# Patient Record
Sex: MALE | Race: WHITE | NOT HISPANIC OR LATINO | Employment: OTHER | URBAN - METROPOLITAN AREA
[De-identification: names, ages, dates, MRNs, and addresses within clinical notes are randomized per-mention and may not be internally consistent; named-entity substitution may affect disease eponyms.]

---

## 2017-02-11 RX ORDER — FUROSEMIDE 40 MG/1
TABLET ORAL
Qty: 90 TABLET | Refills: 3 | Status: SHIPPED | OUTPATIENT
Start: 2017-02-11

## 2017-03-29 ENCOUNTER — HOSPITAL ENCOUNTER (INPATIENT)
Facility: HOSPITAL | Age: 82
LOS: 2 days | Discharge: HOME | DRG: 189 | End: 2017-03-31
Attending: EMERGENCY MEDICINE | Admitting: INTERNAL MEDICINE
Payer: MEDICARE

## 2017-03-29 DIAGNOSIS — I27.20 PULMONARY HTN: ICD-10-CM

## 2017-03-29 DIAGNOSIS — J44.89 ASTHMA WITH COPD: Chronic | ICD-10-CM

## 2017-03-29 DIAGNOSIS — I48.0 AF (PAROXYSMAL ATRIAL FIBRILLATION): Chronic | ICD-10-CM

## 2017-03-29 DIAGNOSIS — Z99.81 ON HOME OXYGEN THERAPY: Chronic | ICD-10-CM

## 2017-03-29 DIAGNOSIS — E66.9 OBESITY (BMI 30.0-34.9): ICD-10-CM

## 2017-03-29 DIAGNOSIS — R91.1 LUNG NODULE: Chronic | ICD-10-CM

## 2017-03-29 DIAGNOSIS — I65.29 CAROTID ATHEROSCLEROSIS, UNSPECIFIED LATERALITY: Chronic | ICD-10-CM

## 2017-03-29 DIAGNOSIS — Z86.79 HISTORY OF AORTIC STENOSIS: Chronic | ICD-10-CM

## 2017-03-29 DIAGNOSIS — I70.0 ATHEROSCLEROSIS OF AORTA: Chronic | ICD-10-CM

## 2017-03-29 DIAGNOSIS — D72.829 LEUKOCYTOSIS, UNSPECIFIED TYPE: ICD-10-CM

## 2017-03-29 DIAGNOSIS — M24.511 CONTRACTURE OF JOINT OF RIGHT SHOULDER REGION: ICD-10-CM

## 2017-03-29 DIAGNOSIS — R06.03 ACUTE RESPIRATORY DISTRESS: ICD-10-CM

## 2017-03-29 DIAGNOSIS — I50.9 HEART FAILURE: ICD-10-CM

## 2017-03-29 DIAGNOSIS — I50.32 CHRONIC DIASTOLIC HEART FAILURE: Chronic | ICD-10-CM

## 2017-03-29 DIAGNOSIS — R94.31 ABNORMAL ECG: Chronic | ICD-10-CM

## 2017-03-29 DIAGNOSIS — N18.30 CKD (CHRONIC KIDNEY DISEASE) STAGE 3, GFR 30-59 ML/MIN: ICD-10-CM

## 2017-03-29 DIAGNOSIS — I35.0 NONRHEUMATIC AORTIC VALVE STENOSIS: Chronic | ICD-10-CM

## 2017-03-29 DIAGNOSIS — R73.03 PREDIABETES: ICD-10-CM

## 2017-03-29 DIAGNOSIS — I38 HEART VALVE REGURGITATION: ICD-10-CM

## 2017-03-29 DIAGNOSIS — R17 ELEVATED BILIRUBIN: ICD-10-CM

## 2017-03-29 DIAGNOSIS — I10 ESSENTIAL HYPERTENSION: Chronic | ICD-10-CM

## 2017-03-29 DIAGNOSIS — I71.40 ABDOMINAL AORTIC ANEURYSM WITHOUT RUPTURE: Chronic | ICD-10-CM

## 2017-03-29 DIAGNOSIS — R09.02 HYPOXIA: ICD-10-CM

## 2017-03-29 DIAGNOSIS — J18.9 PNEUMONIA OF BOTH LOWER LOBES DUE TO INFECTIOUS ORGANISM: ICD-10-CM

## 2017-03-29 DIAGNOSIS — I25.10 CORONARY ARTERY DISEASE, OCCLUSIVE: Chronic | ICD-10-CM

## 2017-03-29 DIAGNOSIS — I50.33 ACUTE ON CHRONIC DIASTOLIC CONGESTIVE HEART FAILURE: Primary | ICD-10-CM

## 2017-03-29 DIAGNOSIS — R06.02 SOB (SHORTNESS OF BREATH): ICD-10-CM

## 2017-03-29 PROBLEM — J96.20 ACUTE ON CHRONIC RESPIRATORY FAILURE: Status: ACTIVE | Noted: 2017-03-29

## 2017-03-29 LAB
ALBUMIN SERPL BCP-MCNC: 3.3 G/DL
ALLENS TEST: ABNORMAL
ALP SERPL-CCNC: 77 U/L
ALT SERPL W/O P-5'-P-CCNC: 14 U/L
ANION GAP SERPL CALC-SCNC: 11 MMOL/L
APTT BLDCRRT: 33.3 SEC
AST SERPL-CCNC: 14 U/L
BASOPHILS # BLD AUTO: 0.02 K/UL
BASOPHILS NFR BLD: 0.1 %
BILIRUB SERPL-MCNC: 1.3 MG/DL
BILIRUB UR QL STRIP: NEGATIVE
BNP SERPL-MCNC: 428 PG/ML
BUN SERPL-MCNC: 25 MG/DL
CALCIUM SERPL-MCNC: 9.4 MG/DL
CHLORIDE SERPL-SCNC: 100 MMOL/L
CK MB SERPL-MCNC: 1.6 NG/ML
CK MB SERPL-RTO: 0.8 %
CK SERPL-CCNC: 212 U/L
CK SERPL-CCNC: 212 U/L
CLARITY UR: CLEAR
CO2 SERPL-SCNC: 29 MMOL/L
COLOR UR: YELLOW
CREAT SERPL-MCNC: 1.4 MG/DL
D DIMER PPP IA.FEU-MCNC: 0.89 MG/L FEU
DELSYS: ABNORMAL
DIFFERENTIAL METHOD: ABNORMAL
EOSINOPHIL # BLD AUTO: 0 K/UL
EOSINOPHIL NFR BLD: 0.1 %
ERYTHROCYTE [DISTWIDTH] IN BLOOD BY AUTOMATED COUNT: 16.6 %
EST. GFR  (AFRICAN AMERICAN): 51 ML/MIN/1.73 M^2
EST. GFR  (NON AFRICAN AMERICAN): 44 ML/MIN/1.73 M^2
FIO2: 21
FLUAV AG SPEC QL IA: NEGATIVE
FLUBV AG SPEC QL IA: NEGATIVE
GLUCOSE SERPL-MCNC: 136 MG/DL
GLUCOSE UR QL STRIP: NEGATIVE
HCO3 UR-SCNC: 26.7 MMOL/L (ref 24–28)
HCT VFR BLD AUTO: 40.5 %
HGB BLD-MCNC: 13.1 G/DL
HGB UR QL STRIP: NEGATIVE
INR PPP: 1.3
KETONES UR QL STRIP: NEGATIVE
LACTATE SERPL-SCNC: 1.2 MMOL/L
LEUKOCYTE ESTERASE UR QL STRIP: NEGATIVE
LYMPHOCYTES # BLD AUTO: 0.7 K/UL
LYMPHOCYTES NFR BLD: 4.1 %
MAGNESIUM SERPL-MCNC: 1.8 MG/DL
MCH RBC QN AUTO: 27.1 PG
MCHC RBC AUTO-ENTMCNC: 32.3 %
MCV RBC AUTO: 84 FL
MODE: ABNORMAL
MONOCYTES # BLD AUTO: 2.1 K/UL
MONOCYTES NFR BLD: 12.2 %
NEUTROPHILS # BLD AUTO: 14.3 K/UL
NEUTROPHILS NFR BLD: 84 %
NITRITE UR QL STRIP: NEGATIVE
OVALOCYTES BLD QL SMEAR: ABNORMAL
PCO2 BLDA: 39.8 MMHG (ref 35–45)
PH SMN: 7.43 [PH] (ref 7.35–7.45)
PH UR STRIP: 6 [PH] (ref 5–8)
PHOSPHATE SERPL-MCNC: 2.3 MG/DL
PLATELET # BLD AUTO: 241 K/UL
PMV BLD AUTO: 9.7 FL
PO2 BLDA: 40 MMHG (ref 80–100)
POC BE: 2 MMOL/L
POC SATURATED O2: 77 % (ref 95–100)
POTASSIUM SERPL-SCNC: 4.7 MMOL/L
PROT SERPL-MCNC: 7.4 G/DL
PROT UR QL STRIP: ABNORMAL
PROTHROMBIN TIME: 13.7 SEC
RBC # BLD AUTO: 4.84 M/UL
SAMPLE: ABNORMAL
SITE: ABNORMAL
SODIUM SERPL-SCNC: 140 MMOL/L
SP GR UR STRIP: 1.02 (ref 1–1.03)
SPECIMEN SOURCE: NORMAL
STOMATOCYTES BLD QL SMEAR: PRESENT
TROPONIN I SERPL DL<=0.01 NG/ML-MCNC: 0.02 NG/ML
TSH SERPL DL<=0.005 MIU/L-ACNC: 0.62 UIU/ML
URN SPEC COLLECT METH UR: ABNORMAL
UROBILINOGEN UR STRIP-ACNC: NEGATIVE EU/DL
WBC # BLD AUTO: 17.17 K/UL

## 2017-03-29 PROCEDURE — 25000003 PHARM REV CODE 250: Performed by: EMERGENCY MEDICINE

## 2017-03-29 PROCEDURE — 94799 UNLISTED PULMONARY SVC/PX: CPT

## 2017-03-29 PROCEDURE — 96365 THER/PROPH/DIAG IV INF INIT: CPT

## 2017-03-29 PROCEDURE — 85379 FIBRIN DEGRADATION QUANT: CPT

## 2017-03-29 PROCEDURE — 94761 N-INVAS EAR/PLS OXIMETRY MLT: CPT

## 2017-03-29 PROCEDURE — 94640 AIRWAY INHALATION TREATMENT: CPT

## 2017-03-29 PROCEDURE — 84443 ASSAY THYROID STIM HORMONE: CPT

## 2017-03-29 PROCEDURE — 80053 COMPREHEN METABOLIC PANEL: CPT

## 2017-03-29 PROCEDURE — 83735 ASSAY OF MAGNESIUM: CPT

## 2017-03-29 PROCEDURE — 83605 ASSAY OF LACTIC ACID: CPT

## 2017-03-29 PROCEDURE — 63600175 PHARM REV CODE 636 W HCPCS: Performed by: EMERGENCY MEDICINE

## 2017-03-29 PROCEDURE — 25000242 PHARM REV CODE 250 ALT 637 W/ HCPCS: Performed by: NURSE PRACTITIONER

## 2017-03-29 PROCEDURE — 82553 CREATINE MB FRACTION: CPT

## 2017-03-29 PROCEDURE — 83880 ASSAY OF NATRIURETIC PEPTIDE: CPT

## 2017-03-29 PROCEDURE — 36600 WITHDRAWAL OF ARTERIAL BLOOD: CPT

## 2017-03-29 PROCEDURE — 63600175 PHARM REV CODE 636 W HCPCS: Performed by: NURSE PRACTITIONER

## 2017-03-29 PROCEDURE — 85610 PROTHROMBIN TIME: CPT

## 2017-03-29 PROCEDURE — 99900035 HC TECH TIME PER 15 MIN (STAT)

## 2017-03-29 PROCEDURE — 96375 TX/PRO/DX INJ NEW DRUG ADDON: CPT

## 2017-03-29 PROCEDURE — 85730 THROMBOPLASTIN TIME PARTIAL: CPT

## 2017-03-29 PROCEDURE — 84100 ASSAY OF PHOSPHORUS: CPT

## 2017-03-29 PROCEDURE — 82803 BLOOD GASES ANY COMBINATION: CPT

## 2017-03-29 PROCEDURE — 25500020 PHARM REV CODE 255: Performed by: EMERGENCY MEDICINE

## 2017-03-29 PROCEDURE — 87040 BLOOD CULTURE FOR BACTERIA: CPT | Mod: 59

## 2017-03-29 PROCEDURE — 81003 URINALYSIS AUTO W/O SCOPE: CPT

## 2017-03-29 PROCEDURE — 25000003 PHARM REV CODE 250: Performed by: NURSE PRACTITIONER

## 2017-03-29 PROCEDURE — 84484 ASSAY OF TROPONIN QUANT: CPT

## 2017-03-29 PROCEDURE — 21400001 HC TELEMETRY ROOM

## 2017-03-29 PROCEDURE — 93010 ELECTROCARDIOGRAM REPORT: CPT | Mod: ,,, | Performed by: INTERNAL MEDICINE

## 2017-03-29 PROCEDURE — 87400 INFLUENZA A/B EACH AG IA: CPT | Mod: 59

## 2017-03-29 PROCEDURE — 27000221 HC OXYGEN, UP TO 24 HOURS

## 2017-03-29 PROCEDURE — 99285 EMERGENCY DEPT VISIT HI MDM: CPT | Mod: 25

## 2017-03-29 PROCEDURE — 85025 COMPLETE CBC W/AUTO DIFF WBC: CPT

## 2017-03-29 RX ORDER — FUROSEMIDE 10 MG/ML
60 INJECTION INTRAMUSCULAR; INTRAVENOUS 2 TIMES DAILY
Status: DISCONTINUED | OUTPATIENT
Start: 2017-03-29 | End: 2017-03-30

## 2017-03-29 RX ORDER — PANTOPRAZOLE SODIUM 40 MG/1
40 TABLET, DELAYED RELEASE ORAL DAILY
Status: DISCONTINUED | OUTPATIENT
Start: 2017-03-29 | End: 2017-03-31 | Stop reason: HOSPADM

## 2017-03-29 RX ORDER — TAMSULOSIN HYDROCHLORIDE 0.4 MG/1
1 CAPSULE ORAL DAILY
Status: DISCONTINUED | OUTPATIENT
Start: 2017-03-29 | End: 2017-03-30

## 2017-03-29 RX ORDER — ENOXAPARIN SODIUM 100 MG/ML
1 INJECTION SUBCUTANEOUS
Status: COMPLETED | OUTPATIENT
Start: 2017-03-29 | End: 2017-03-29

## 2017-03-29 RX ORDER — FUROSEMIDE 10 MG/ML
60 INJECTION INTRAMUSCULAR; INTRAVENOUS
Status: COMPLETED | OUTPATIENT
Start: 2017-03-29 | End: 2017-03-29

## 2017-03-29 RX ORDER — ATORVASTATIN CALCIUM 40 MG/1
80 TABLET, FILM COATED ORAL DAILY
Status: DISCONTINUED | OUTPATIENT
Start: 2017-03-29 | End: 2017-03-31 | Stop reason: HOSPADM

## 2017-03-29 RX ORDER — IPRATROPIUM BROMIDE AND ALBUTEROL SULFATE 2.5; .5 MG/3ML; MG/3ML
3 SOLUTION RESPIRATORY (INHALATION)
Status: DISCONTINUED | OUTPATIENT
Start: 2017-03-29 | End: 2017-03-31 | Stop reason: HOSPADM

## 2017-03-29 RX ORDER — RANOLAZINE 500 MG/1
500 TABLET, EXTENDED RELEASE ORAL 2 TIMES DAILY
Status: DISCONTINUED | OUTPATIENT
Start: 2017-03-29 | End: 2017-03-31 | Stop reason: HOSPADM

## 2017-03-29 RX ORDER — ATENOLOL 50 MG/1
50 TABLET ORAL DAILY
Status: DISCONTINUED | OUTPATIENT
Start: 2017-03-29 | End: 2017-03-30

## 2017-03-29 RX ORDER — CLOPIDOGREL BISULFATE 75 MG/1
75 TABLET ORAL DAILY
Status: DISCONTINUED | OUTPATIENT
Start: 2017-03-29 | End: 2017-03-31 | Stop reason: HOSPADM

## 2017-03-29 RX ORDER — ISOSORBIDE MONONITRATE 60 MG/1
120 TABLET, EXTENDED RELEASE ORAL DAILY
Status: DISCONTINUED | OUTPATIENT
Start: 2017-03-29 | End: 2017-03-31 | Stop reason: HOSPADM

## 2017-03-29 RX ORDER — ACETAMINOPHEN 325 MG/1
650 TABLET ORAL
Status: COMPLETED | OUTPATIENT
Start: 2017-03-29 | End: 2017-03-29

## 2017-03-29 RX ORDER — DILTIAZEM HYDROCHLORIDE 120 MG/1
120 CAPSULE, COATED, EXTENDED RELEASE ORAL DAILY
Status: DISCONTINUED | OUTPATIENT
Start: 2017-03-29 | End: 2017-03-30

## 2017-03-29 RX ORDER — ONDANSETRON 2 MG/ML
4 INJECTION INTRAMUSCULAR; INTRAVENOUS EVERY 8 HOURS PRN
Status: DISCONTINUED | OUTPATIENT
Start: 2017-03-29 | End: 2017-03-31 | Stop reason: HOSPADM

## 2017-03-29 RX ORDER — ASPIRIN 81 MG/1
81 TABLET ORAL NIGHTLY
Status: DISCONTINUED | OUTPATIENT
Start: 2017-03-29 | End: 2017-03-31 | Stop reason: HOSPADM

## 2017-03-29 RX ADMIN — ENOXAPARIN SODIUM 90 MG: 100 INJECTION SUBCUTANEOUS at 11:03

## 2017-03-29 RX ADMIN — IOHEXOL 100 ML: 350 INJECTION, SOLUTION INTRAVENOUS at 11:03

## 2017-03-29 RX ADMIN — IPRATROPIUM BROMIDE AND ALBUTEROL SULFATE 3 ML: .5; 3 SOLUTION RESPIRATORY (INHALATION) at 08:03

## 2017-03-29 RX ADMIN — CEFTRIAXONE 1 G: 1 INJECTION, SOLUTION INTRAVENOUS at 12:03

## 2017-03-29 RX ADMIN — FUROSEMIDE 60 MG: 10 INJECTION, SOLUTION INTRAMUSCULAR; INTRAVENOUS at 11:03

## 2017-03-29 RX ADMIN — FUROSEMIDE 60 MG: 10 INJECTION, SOLUTION INTRAMUSCULAR; INTRAVENOUS at 05:03

## 2017-03-29 RX ADMIN — AZITHROMYCIN MONOHYDRATE 500 MG: 500 INJECTION, POWDER, LYOPHILIZED, FOR SOLUTION INTRAVENOUS at 03:03

## 2017-03-29 RX ADMIN — ACETAMINOPHEN 650 MG: 325 TABLET ORAL at 09:03

## 2017-03-29 NOTE — SUBJECTIVE & OBJECTIVE
Past Medical History:   Diagnosis Date    *Atrial fibrillation     Acute decompensated heart failure 4/8/2014    Anticoagulant long-term use     aortic stenosis     degenerative- trileaflets    Aortic stenosis, severe 4/9/2014    Aortic valve disorders 5/13/2014    Arthritis     Asthma     Atrial fibrillation with RVR 1/30/2014    Atrial fibrillation, permanent 8/7/2013    Cancer 1970s, 2006, 2009    skin-removed from nose    Cardiomyopathy     Carotid atherosclerosis 6/27/2016    CHF (congestive heart failure)     CKD (chronic kidney disease) stage 3, GFR 30-59 ml/min     Coronary artery disease     Coronary artery disease involving native coronary artery of native heart without angina pectoris 10/29/2014    Per patient s/p stents x2     DDD (degenerative disc disease), cervical     Heart murmur     History of coronary angioplasty 10/3/2014    Hypertension     Hypotension, postural 8/7/2013    Pleural effusion     x3 total Thoracentesis 3/11/14 and 2/14/14     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    Severe aortic stenosis 4/9/2014    Syncope and collapse 2/3/2014    Tobacco dependence     resolved       Past Surgical History:   Procedure Laterality Date    CARDIAC CATHETERIZATION  4/11/14    s/p ostial LM HORACIO, mid-RCA HORACIO    CARDIAC VALVE SURGERY  05/2014    aortic     CATARACT EXTRACTION Bilateral     EYE SURGERY Bilateral     laser    ruptured tendon      right arm    SKIN BIOPSY      SKIN CANCER EXCISION      per patient nose x3    THORACENTESIS      x3       Review of patient's allergies indicates:  No Known Allergies    No current facility-administered medications on file prior to encounter.      Current Outpatient Prescriptions on File Prior to Encounter   Medication Sig    aspirin (ECOTRIN) 81 MG EC tablet Take 81 mg by mouth every evening.    atenolol (TENORMIN) 25 MG tablet TAKE 2 TABLETS ONE TIME DAILY    atorvastatin (LIPITOR) 80 MG tablet TAKE  1 TABLET ONE TIME DAILY    CARTIA  mg Cp24 TAKE 1 CAPSULE EVERY DAY    clopidogrel (PLAVIX) 75 mg tablet TAKE 1 TABLET ONE TIME DAILY    furosemide (LASIX) 40 MG tablet TAKE 1 TABLET EVERY DAY    isosorbide mononitrate (IMDUR) 120 MG 24 hr tablet TAKE 1 TABLET EVERY DAY    MULTIVITAMIN W-MINERALS/LUTEIN (CENTRUM SILVER ORAL) Take by mouth.    pantoprazole (PROTONIX) 40 MG tablet TAKE 1 TABLET ONE TIME DAILY    potassium chloride SA (K-DUR,KLOR-CON) 20 MEQ tablet TAKE 1 TABLET ONE TIME DAILY    ranolazine (RANEXA) 500 MG Tb12 Take 1 tablet (500 mg total) by mouth 2 (two) times daily.    tamsulosin (FLOMAX) 0.4 mg Cp24 Take 1 capsule (0.4 mg total) by mouth once daily.    albuterol-ipratropium 2.5mg-0.5mg/3mL (DUO-NEB) 0.5 mg-3 mg(2.5 mg base)/3 mL nebulizer solution Take 3 mLs by nebulization every 6 (six) hours. (Patient taking differently: Take 3 mLs by nebulization 2 (two) times daily. )    co-enzyme Q-10 50 mg capsule Take 50 mg by mouth once daily.    GLUCOSAMINE HCL/CHONDR HOLT A NA (OSTEO BI-FLEX ORAL) Take by mouth 2 (two) times daily.    magnesium oxide (MAG-OX) 400 mg tablet Take 400 mg by mouth 2 (two) times daily.      Family History     Problem Relation (Age of Onset)    Emphysema Sister    Heart attack Brother, Brother, Sister    Heart disease Father, Brother, Brother, Sister    Hypertension Brother    No Known Problems Brother    Pulmonary embolism Mother    Stroke Brother        Social History Main Topics    Smoking status: Former Smoker     Packs/day: 1.00     Years: 25.00     Quit date: 8/7/1968    Smokeless tobacco: Never Used    Alcohol use No    Drug use: No    Sexual activity: No     Review of Systems   Constitutional: Positive for fever.   HENT: Positive for congestion. Negative for sinus pressure, sneezing, sore throat and trouble swallowing.    Eyes: Negative for redness, itching and visual disturbance.   Respiratory: Positive for cough, shortness of breath and wheezing.     Cardiovascular: Positive for leg swelling. Negative for chest pain and palpitations.   Gastrointestinal: Negative for abdominal pain, diarrhea, nausea and vomiting.   Endocrine: Negative for cold intolerance and heat intolerance.   Genitourinary: Negative for difficulty urinating, dysuria, flank pain, frequency and urgency.   Musculoskeletal: Negative for arthralgias, back pain and myalgias.   Skin: Negative for color change and wound.   Allergic/Immunologic: Negative for environmental allergies and food allergies.   Neurological: Positive for weakness. Negative for dizziness, syncope and headaches.   Hematological: Does not bruise/bleed easily.   Psychiatric/Behavioral: Negative for agitation, confusion and sleep disturbance. The patient is not nervous/anxious.      Objective:     Vital Signs (Most Recent):  Temp: 98.7 °F (37.1 °C) (03/29/17 1624)  Pulse: 87 (03/29/17 1624)  Resp: (!) 22 (03/29/17 1624)  BP: (!) 114/55 (03/29/17 1624)  SpO2: (!) 92 % (03/29/17 1624) Vital Signs (24h Range):  Temp:  [98.7 °F (37.1 °C)-99.6 °F (37.6 °C)] 98.7 °F (37.1 °C)  Pulse:  [87-95] 87  Resp:  [18-22] 22  SpO2:  [83 %-93 %] 92 %  BP: (109-122)/(52-65) 114/55     Weight: 87.3 kg (192 lb 6.4 oz)  Body mass index is 31.05 kg/(m^2).    Physical Exam   Constitutional: He is oriented to person, place, and time. He appears well-developed and well-nourished.   HENT:   Head: Normocephalic.   Nose: Nose normal.   Mouth/Throat: Oropharynx is clear and moist.   Eyes: Conjunctivae and EOM are normal.   Neck: Normal range of motion.   Cardiovascular: Normal rate, regular rhythm, normal heart sounds and intact distal pulses.  Exam reveals no friction rub.    Pulmonary/Chest: Tachypnea noted. He has wheezes in the right upper field and the left upper field. He has rales in the right lower field and the left lower field.   Abdominal: Soft. Bowel sounds are normal. He exhibits no distension. There is no tenderness.   Musculoskeletal: Normal  range of motion.   Neurological: He is alert and oriented to person, place, and time.   Skin: Skin is warm and dry.   Psychiatric: He has a normal mood and affect. His behavior is normal. Thought content normal.        Significant Labs:   CBC:   Recent Labs  Lab 03/29/17  0915   WBC 17.17*   HGB 13.1*   HCT 40.5        CMP:   Recent Labs  Lab 03/29/17  0915      K 4.7      CO2 29   *   BUN 25*   CREATININE 1.4   CALCIUM 9.4   PROT 7.4   ALBUMIN 3.3*   BILITOT 1.3*   ALKPHOS 77   AST 14   ALT 14   ANIONGAP 11   EGFRNONAA 44*       Significant Imaging:   Imaging Results         CTA Chest Non Coronary (Final result) Result time:  03/29/17 12:25:53    Final result by Cam Palacios MD (03/29/17 12:25:53)    Impression:        1. No pulmonary embolus.   2.  Bilateral lower lobe pneumonia, much worse in the right lung, with some reactive right hilar lymphadenopathy.        All CT scans at this facility use dose modulation, iterative reconstruction and/or weight based dosing when appropriate to reduce radiation dose to as low as reasonably achievable.      Electronically signed by: CAM PALACIOS MD  Date:     03/29/17  Time:    12:25     Narrative:    Exam: CTA chest with intravenous contrast.    Clinical History: Altered shortness of breath.    Technique: Axial CTA images performed through the chest after the administration of 100 cc intravenous contrast. 3D MIP images were performed and interpreted.    Findings:        There is no evidence of pulmonary embolus. Pulmonary artery caliber is normal. The patient has had an aortic valve repair.  There is calcific atherosclerotic disease of the coronary arteries.  The heart size is enlarged.  There is no thoracic aortic aneurysm or dissection. There is some mild reactive right hilar lymphadenopathy.    There is patchy consolidation throughout the entirety of the right lower lobe.  There is bronchial wall thickening and some bronchovascular interstitial  infiltrate in the left lower lobe.  The upper lobes and right middle lobe are clear. No pleural effusion or pneumothorax.    There are calcified gallstones.    There are old left-sided rib fractures.            X-Ray Chest 1 View (Final result) Result time:  03/29/17 10:09:13    Final result by SPEEDY Harris Sr., MD (03/29/17 10:09:13)    Impression:        1.  There is no evidence of an acute pulmonary process.    2.  There is silhouetting of the inferior aspect of the left border of the heart.  This is characteristic of a pericardial fat pad.  3.  There are healed fractures on the left side of the thoracic cage.        Electronically signed by: SPEEDY HARRIS MD  Date:     03/29/17  Time:    10:09     Narrative:    One-view chest x-ray    Clinical History: Shortness of breath    Finding: Comparison was made to a prior examination performed on 4/19/2016.  There is silhouetting of the inferior aspect of the left border of the heart.  There is no evidence of an acute pulmonary process.  There is no pneumothorax.  The costophrenic angles are sharp.  There are healed fractures on the left side of the thoracic cage.

## 2017-03-29 NOTE — ASSESSMENT & PLAN NOTE
-Pt admitted to Telemetry Unit   -IVF  -IV antibiotics (Azithromycin and Rocephin)  -supplemental oxygen  -lactate 1.2  -will follow blood culture results

## 2017-03-29 NOTE — ED NOTES
Pt. Lying in bed quietly, resp. Even and unlabored, o2 3L NC in progress. Pt. Reports he feels better. Son at bedside.

## 2017-03-29 NOTE — PLAN OF CARE
Problem: Patient Care Overview  Goal: Individualization & Mutuality  Outcome: Ongoing (interventions implemented as appropriate)  DX:CHF  PATIENT REMAINS FREE FROM FALLS, FALL PRECAUTIONS IN PLACE.  VS STABLE  NO OTHER C/O AT THIS TIME  CALL [facility] WITHIN REACH, REMINDED TO CALL FOR ASSISTANCE.

## 2017-03-29 NOTE — IP AVS SNAPSHOT
[address] [place] [address]           Patient Discharge Instructions   Our goal is to set you up for success. This packet includes information on your condition, medications, and your home care.  It will help you care for yourself to prevent having to return to the hospital.     Please ask your nurse if you have any questions.      There are many details to remember when preparing to leave the hospital. Here is what you will need to do:    1. Take your medicine. If you are prescribed medications, review your Medication List on the following pages. You may have new medications to  at the pharmacy and others that you'll need to stop taking. Review the instructions for how and when to take your medications. Talk with your doctor or nurses if you are unsure of what to do.     2. Go to your follow-up appointments. Specific follow-up information is listed in the following pages. Your may be contacted by a nurse or clinical provider about future appointments. Be sure we have all of the phone numbers to reach you. Please contact your provider's office if you are unable to make an appointment.     3. Watch for warning signs. Your doctor or nurse will give you detailed warning signs to watch for and when to call for assistance. These instructions may also include educational information about your condition. If you experience any of warning signs to your health, call your doctor.               ** Verify the list of medication(s) below is accurate and up to date. Carry this with you in case of emergency. If your medications have changed, please notify your healthcare provider.             Medication List      START taking these medications        Additional Info                      amoxicillin-clavulanate 875-125mg 875-125 mg per tablet   Commonly known as:  AUGMENTIN   Quantity:  20 tablet   Refills:  0   Dose:  1 tablet    Instructions:  Take 1 tablet by mouth 2  (two) times daily.     Begin Date    AM    Noon    PM    Bedtime       azithromycin 250 MG tablet   Commonly known as:  Z-DEEPAK   Quantity:  5 tablet   Refills:  0   Dose:  250 mg    Instructions:  Take 1 tablet (250 mg total) by mouth once daily.     Begin Date    AM    Noon    PM    Bedtime       Saccharomyces boulardii 250 mg capsule   Commonly known as:  FLORASTOR   Quantity:  60 capsule   Refills:  0   Dose:  250 mg    Instructions:  Take 1 capsule (250 mg total) by mouth 2 (two) times daily.     Begin Date    AM    Noon    PM    Bedtime         CHANGE how you take these medications        Additional Info                      albuterol-ipratropium 2.5mg-0.5mg/3mL 0.5 mg-3 mg(2.5 mg base)/3 mL nebulizer solution   Commonly known as:  DUO-NEB   Quantity:  120 vial   Refills:  12   Dose:  3 mL   What changed:  when to take this    Last time this was given:  3 mLs on 3/31/2017  7:30 AM   Instructions:  Take 3 mLs by nebulization every 6 (six) hours.     Begin Date    AM    Noon    PM    Bedtime         CONTINUE taking these medications        Additional Info                      aspirin 81 MG EC tablet   Commonly known as:  ECOTRIN   Refills:  0   Dose:  81 mg    Last time this was given:  81 mg on 3/30/2017 10:16 PM   Instructions:  Take 81 mg by mouth every evening.     Begin Date    AM    Noon    PM    Bedtime       atenolol 25 MG tablet   Commonly known as:  TENORMIN   Quantity:  180 tablet   Refills:  3    Last time this was given:  50 mg on 3/30/2017 10:16 PM   Instructions:  TAKE 2 TABLETS ONE TIME DAILY     Begin Date    AM    Noon    PM    Bedtime       atorvastatin 80 MG tablet   Commonly known as:  LIPITOR   Quantity:  90 tablet   Refills:  4    Instructions:  TAKE 1 TABLET ONE TIME DAILY     Begin Date    AM    Noon    PM    Bedtime       CARTIA  MG Cp24   Quantity:  90 capsule   Refills:  3   Generic drug:  diltiaZEM    Last time this was given:  120 mg on 3/30/2017 10:16 PM   Instructions:  TAKE 1  CAPSULE EVERY DAY     Begin Date    AM    Noon    PM    Bedtime       CENTRUM SILVER ORAL   Refills:  0    Instructions:  Take by mouth.     Begin Date    AM    Noon    PM    Bedtime       clopidogrel 75 mg tablet   Commonly known as:  PLAVIX   Quantity:  90 tablet   Refills:  3    Instructions:  TAKE 1 TABLET ONE TIME DAILY     Begin Date    AM    Noon    PM    Bedtime       co-enzyme Q-10 50 mg capsule   Refills:  0   Dose:  50 mg    Instructions:  Take 50 mg by mouth once daily.     Begin Date    AM    Noon    PM    Bedtime       furosemide 40 MG tablet   Commonly known as:  LASIX   Quantity:  90 tablet   Refills:  3    Instructions:  TAKE 1 TABLET EVERY DAY     Begin Date    AM    Noon    PM    Bedtime       isosorbide mononitrate 120 MG 24 hr tablet   Commonly known as:  IMDUR   Quantity:  90 tablet   Refills:  3    Instructions:  TAKE 1 TABLET EVERY DAY     Begin Date    AM    Noon    PM    Bedtime       magnesium oxide 400 mg tablet   Commonly known as:  MAG-OX   Refills:  0   Dose:  400 mg    Instructions:  Take 400 mg by mouth 2 (two) times daily.     Begin Date    AM    Noon    PM    Bedtime       OSTEO BI-FLEX ORAL   Refills:  0    Instructions:  Take by mouth 2 (two) times daily.     Begin Date    AM    Noon    PM    Bedtime       pantoprazole 40 MG tablet   Commonly known as:  PROTONIX   Quantity:  90 tablet   Refills:  4    Instructions:  TAKE 1 TABLET ONE TIME DAILY     Begin Date    AM    Noon    PM    Bedtime       potassium chloride SA 20 MEQ tablet   Commonly known as:  K-DUR,KLOR-CON   Quantity:  90 tablet   Refills:  3    Instructions:  TAKE 1 TABLET ONE TIME DAILY     Begin Date    AM    Noon    PM    Bedtime       ranolazine 500 MG Tb12   Commonly known as:  RANEXA   Quantity:  180 tablet   Refills:  3   Dose:  500 mg    Last time this was given:  500 mg on 3/30/2017 10:17 PM   Instructions:  Take 1 tablet (500 mg total) by mouth 2 (two) times daily.     Begin Date    AM    Noon    PM     Bedtime       tamsulosin 0.4 mg Cp24   Commonly known as:  FLOMAX   Quantity:  90 capsule   Refills:  3   Dose:  1 capsule    Last time this was given:  0.4 mg on 3/30/2017  7:50 PM   Instructions:  Take 1 capsule (0.4 mg total) by mouth once daily.     Begin Date    AM    Noon    PM    Bedtime            Where to Get Your Medications      These medications were sent to [facility] [address] [place] [address]     Phone:  [phone]     amoxicillin-clavulanate 875-125mg 875-125 mg per tablet    azithromycin 250 MG tablet    Saccharomyces boulardii 250 mg capsule                  Please bring to all follow up appointments:    1. A copy of your discharge instructions.  2. All medicines you are currently taking in their original bottles.  3. Identification and insurance card.    Please arrive 15 minutes ahead of scheduled appointment time.    Please call 24 hours in advance if you must reschedule your appointment and/or time.        Your Scheduled Appointments     Jul 17, 2017  8:00 AM CDT   Us Abdomen with [facility] US[facility]   [facility]    [address]  [facility] [address] [phone]              Follow-up Information     Follow up with Blaire Kent MD In 1 week.    Specialty:  Family Medicine    Why:  Hospital follow up    Contact information:    [address] [facility] [address]  [facility] [address]  [phone]        Referrals     Future Orders    Ambulatory Referral to Pharmacy Assistance     Questions:    Medication(s) needing assistance with?:  [facility] // pharmacy patient   assistanance referral placed        Discharge Instructions     Future Orders    Diet Cardiac     Scheduling Instructions:    Limit Sodium to 2 g daily        Primary Diagnosis     Your primary diagnosis was:  Bilateral Pneumonia      Admission Information     Date & Time Provider Department CSN    3/29/2017  8:50  "AM Galindo Mckinonn MD [facility] [id number]      Care Providers     Provider Role Specialty Primary office phone    Galindo Mckinnon MD Attending Provider Internal Medicine [phone]    Galindo Mckinnon MD Consulting Physician  Internal Medicine [phone]    Galindo Mckinnon MD Team Attending  Internal Medicine [phone]      Your Vitals Were     BP Pulse Temp Resp Height Weight    128/83 (BP Location: Right arm, Patient Position: Sitting, BP Method: Automatic) 114 98.4 °F (36.9 °C) (Oral) 20 5' 6" (1.676 m) 87.2 kg (192 lb 4.8 oz)    SpO2 BMI             93% 31.04 kg/m2         Recent Lab Values        11/18/2013 5/13/2014 6/2/2014 11/18/2014 7/8/2016              11:36 AM  6:25 AM  9:04 AM 10:26 AM 12:12 PM       A1C 6.4 (H) 6.0 6.1 6.2 5.9       Comment for A1C at 12:12 PM on 7/8/2016:  According to ADA guidelines, hemoglobin A1C <7.0% represents  optimal control in non-pregnant diabetic patients.  Different  metrics may apply to specific populations.   Standards of Medical Care in Diabetes - 2016.  For the purpose of screening for the presence of diabetes:  <5.7%     Consistent with the absence of diabetes  5.7-6.4%  Consistent with increasing risk for diabetes   (prediabetes)  >or=6.5%  Consistent with diabetes  Currently no consensus exists for use of hemoglobin A1C  for diagnosis of diabetes for children.        Pending Labs     Order Current Status    Procalcitonin In process    Blood culture Preliminary result    Blood culture Preliminary result      Allergies as of 3/31/2017     No Known Allergies      [facility]     [facility] Nurse Care Line - 24/7 Assistance  Unless otherwise directed by your provider, please contact [facility], our nurse care line that is available for 24/7 assistance.     Registered nurses in the [facility] provide clinical advisement, health education, appointment booking, and other advisory services.  Call for this " free service at [phone].        Advance Directives     An advance directive is a document which, in the event you are no longer able to make decisions for yourself, tells your healthcare team what kind of treatment you do or do not want to receive, or who you would like to make those decisions for you.  If you do not currently have an advance directive, [facility] encourages you to create one.  For more information call:  [phone]), [phone] ([phone]),  or log on to [email or web address].        Smoking Cessation     If you would like to quit smoking:   You may be eligible for free services if you are a [place] resident and started smoking cigarettes before September 1, 1988.  Call the Smoking Cessation Trust (SCT) toll free at (143) 250-9842 or (219) 695-9046.   Call 4-987-QUIT-NOW if you do not meet the above criteria.   Contact us via email: [email or web address]   View our website for more information: [email or web address]        Language Assistance Services     ATTENTION: Language assistance services are available, free of charge. Please call [phone].      ATENCIÓN: Si habla español, tiene a allen disposición servicios gratuitos de asistencia lingüística. Llame al [phone].     CHÚ Ý: N?u b?n nói Ti?ng Vi?t, có các d?ch v? h? tr? ngôn ng? mi?n phí dành cho b?n. G?i s? [phone].        Stroke Education              Heart Failure Education       Heart Failure: Being Active  You have a condition called heart failure. Being active doesnt mean that you have to wear yourself out. Even a little movement each day helps to strengthen your heart. If you cant get out to exercise, you can do simple stretching and strengthening exercises at home. These are good ways to keep you well-conditioned and prevent you and your heart from becoming excessively weak.    Ideas to get you started  · Add a little movement to things you do now. Walk to mail letters. Karla  your car at the far end of the parking lot and walk to the store. Walk up a flight of stairs instead of taking the elevator.  · Choose activities you enjoy. You might walk, swim, or ride an exercise bike. Things like gardening and washing the car count, too. Other possibilities include: washing dishes, walking the dog, walking around the mall, and doing aerobic activities with friends.  · Join a group exercise program at a [facility], a senior center, or a community center. Or look into a hospital cardiac rehabilitation program. Ask your doctor if you qualify.  Tips to keep you going  · Get up and get dressed each day. Go to a coffee shop and read a newspaper or go somewhere that you'll be in the presence of other active people. Youll feel more like being active.  · Make a plan. Choose one or more activities that you enjoy and that you can easily do. Then plan to do at least one each day. You might write your plan on a calendar.  · Go with a friend or a group if you like company. This can help you feel supported and stay motivated, too.  · Plan social events that you enjoy. This will keep you mentally engaged as well as physically motivated to do things you find pleasure in.  For your safety  · Talk with your healthcare provider before starting an exercise program.  · Exercise indoors when its too hot or too cold outside, or when the air quality is poor. Try walking at a shopping mall.  · Wear socks and sturdy shoes to maintain your balance and prevent falls.  · Start slowly. Do a few minutes several times a day at first. Increase your time and speed little by little.  · Stop and rest whenever you feel tired or get short of breath.  · Dont push yourself on days when you dont feel well.  Date Last Reviewed: 3/20/2016  © 1852-1084 [employer]. [address] [place] [address], [place] [address]. All rights reserved. This information is not intended as a substitute for professional medical care. Always follow  your healthcare professional's instructions.              Heart Failure: Evaluating Your Heart  You have a condition called heart failure. To evaluate your condition, your doctor will examine you, ask questions, and do some tests. Along with looking for signs of heart failure, the doctor looks for any other health problems that may have led to heart failure. The results of your evaluation will help your doctor form a treatment plan.  Health history and physical exam  Your visit will start with a health history. Tell the doctor about any symptoms youve noticed and about all medicines you take. Then youll have a physical exam. This includes listening to your heartbeat and breathing. Youll also be checked for swelling (edema) in your legs and neck. When you have fluid buildup or fluid in the lungs, it may be called congestive heart failure.  Diagnosing heart failure     During an echocardiogram, sound waves bounce off the heart. These are converted into a picture on the screen.   The following may be done to help your doctor form a diagnosis:  · X-rays show the size and shape of your heart. These pictures can also show fluid in your lungs.  · An electrocardiogram (ECG or EKG) shows the pattern of your heartbeat. Small pads (electrodes) are placed on your chest, arms, and legs. Wires connect the pads to the ECG machine, which records your hearts electrical signals. This can give the doctor information about heart function.  · An echocardiogram uses ultrasound waves to show the structure and movement of your heart muscle. This shows how well the heart pumps. It also shows the thickness of the heart walls, and if the heart is enlarged. It is one of the most useful, non-invasive tests as it provides information about the heart's general function. This helps your doctor make treatment decisions.  · Lab tests evaluate small amounts of blood or urine for signs of problems. A BNP lab test can help diagnose and evaluate  heart failure. BNP stands for B-type natriuretic peptide. The ventricles secrete more BNP when heart failure worsens. Lab tests can also provide information about metabolic dysfunction or heart dysfunction.  Your treatment plan  Based on the results of your evaluation and tests, your doctor will develop a treatment plan. This plan is designed to relieve some of your heart failure symptoms and help make you more comfortable. Your treatment plan may include:  · Medicine to help your heart work better and improve your quality of life  · Changes in what you eat and drink to help prevent fluid from backing up in your body  · Daily monitoring of your weight and heart failure symptoms to see how well your treatment plan is working  · Exercise to help you stay healthy  · Help with quitting smoking  · Emotional and psychological support to help adjust to the changes  · Referrals to other specialists to make sure you are being treated comprehensively  Date Last Reviewed: 3/21/2016  © 5647-4224 [employer]. [address] [place] [address]. All rights reserved. This information is not intended as a substitute for professional medical care. Always follow your healthcare professional's instructions.              Heart Failure: Making Changes to Your Diet  You have a condition called heart failure. When you have heart failure, excess fluid is more likely to build up in your body because your heart isn't working well. This makes the heart work harder to pump blood. Fluid buildup causes symptoms such as shortness of breath and swelling (edema). This is often referred to as congestive heart failure or CHF. Controlling the amount of salt (sodium) you eat may help stop fluid from building up. Your doctor may also tell you to reduce the amount of fluid you drink.  Reading food labels    Your healthcare provider will tell you how much sodium you can eat each day. Read food labels to keep track. Keep in mind that  certain foods are high in salt. These include canned, frozen, and processed foods. Check the amount of sodium in each serving. Watch out for high-sodium ingredients. These include MSG (monosodium glutamate), baking soda, and sodium phosphate.   Eating less salt  Give yourself time to get used to eating less salt. It may take a little while. Here are some tips to help:  · Take the saltshaker off the table. Replace it with salt-free herb mixes and spices.  · Eat fresh or plain frozen vegetables. These have much less salt than canned vegetables.  · Choose low-sodium snacks like sodium-free pretzels, crackers, or air-popped popcorn.  · Dont add salt to your food when youre cooking. Instead, season your foods with pepper, lemon, garlic, or onion.  · When you eat out, ask that your food be cooked without added salt.  · Avoid eating fried foods as these often have a great deal of salt.  If youre told to limit fluids  You may need to limit how much fluid you have to help prevent swelling. This includes anything that is liquid at room temperature, such as ice cream and soup. If your doctor tells you to limit fluid, try these tips:  · Measure drinks in a measuring cup before you drink them. This will help you meet daily goals.  · Chill drinks to make them more refreshing.  · Suck on frozen lemon wedges to quench thirst.  · Only drink when youre thirsty.  · Chew sugarless gum or suck on hard candy to keep your mouth moist.  · Weigh yourself daily to know if your body's fluid content is rising.  My sodium goal  Your healthcare provider may give you a sodium goal to meet each day. This includes sodium found in food as well as salt that you add. My goal is to eat no more than ___________ mg of sodium per day.     When to call your doctor  Call your doctor right away if you have any symptoms of worsening heart failure. These can include:  · Sudden weight gain  · Increased swelling of your legs or ankles  · Trouble breathing  when youre resting or at night  · Increase in the number of pillows you have to sleep on  · Chest pain, pressure, discomfort, or pain in the jaw, neck, or back   Date Last Reviewed: 3/21/2016  © 7054-6019 [facility]. [address] [place] [address]. All rights reserved. This information is not intended as a substitute for professional medical care. Always follow your healthcare professional's instructions.              Heart Failure: Medicines to Help Your Heart    You have a condition called heart failure (also known as congestive heart failure, or CHF). Your doctor will likely prescribe medicines for heart failure and any underlying health problems you have. Most heart failure patients take one or more types of medicinen. Your healthcare provider will work to find the combination of medicines that works best for you.  Heart failure medicines  Here are the most common heart failure medicines:  · ACE inhibitors lower blood pressure and decrease strain on the heart. This makes it easier for the heart to pump. Angiotensin receptor blockers have similar effects. These are prescribed for some patients instead of ACE inhibitors.  · Beta-blockers relieve stress on the heart. They also improve symptoms. They may also improve the heart's pumping action over time.  · Diuretics (also called water pills) help rid your body of excess water. This can help rid your body of swelling (edema). Having less fluid to pump means your heart doesnt have to work as hard. Some diuretics make your body lose a mineral called potassium. Your doctor will tell you if you need to take supplements or eat more foods high in potassium.  · Digoxin helps your heart pump with more strength. This helps your heart pump more blood with each beat. So, more oxygen-rich blood travels to the rest of the body.  · Aldosterone antagonists help alter hormones and decrease strain on the heart.  · Hydralazine and nitrates are two  separate medicines used together to treat heart failure. They may come in one combination pill. They lower blood pressure and decrease how hard the heart has to pump.  Medicines for related conditions  Controlling other heart problems helps keep heart failure under control, too. Depending on other heart problems you have, medicines may be prescribed to:  · Lower blood pressure (antihypertensives).  · Lower cholesterol levels (statins).  · Prevent blood clots (anticoagulants or aspirin).  · Keep the heartbeat steady (antiarrhythmics).  Date Last Reviewed: 3/5/2016  © 4932-1249 [employer]. [address] [place] [address]. All rights reserved. This information is not intended as a substitute for professional medical care. Always follow your healthcare professional's instructions.              Heart Failure: Procedures That May Help    The heart is a muscle that pumps oxygen-rich blood to all parts of the body. When you have heart failure, the heart is not able to pump as well as it should. Blood and fluid may back up into the lungs (congestive heart failure), and some parts of the body dont get enough oxygen-rich blood to work normally. These problems lead to the symptoms of heart failure.     Certain procedures may help the heart pump better in some cases of heart failure. Some procedures are done to treat health problems that may have caused the heart failure such as coronary artery disease or heart rhythm problems. For more serious heart failure, other options are available.  Treating artery and valve problems  If you have coronary artery disease or valve disease, procedures may be done to improve blood flow. This helps the heart pump better, which can improve heart failure symptoms. First, your doctor may do a cardiac catheterization to help detect clogged blood vessels or valve damage. During this procedure, a  thin tube (catheter) in inserted into a blood vessel and guided to the  heart. There a dye is injected and a special type of X-ray (angiogram) is taken of the blood vessels. Procedures to open a blocked artery or fix damaged valves can also be done using catheterization.  · Angioplasty uses a balloon-tipped instrument at the end of the catheter. The balloon is inflated to widen the narrowed artery. In many cases, a stent is expanded to further support the narrowed artery. A stent is a metal mesh tube.  · Valve surgery repairs or replacement of faulty valves can also be done during catheterization so blood can flow properly through the chambers of the heart.  Bypass surgery is another option to help treat blocked arteries. It uses a healthy blood vessel from elsewhere in the body. The healthy blood vessel is attached above and below the blocked area so that blood can flow around the blocked artery.  Treating heart rhythm problems  A device may be placed in the chest to help a weak heart maintain a healthy, heartbeat so the heart can pump more effectively:  · Pacemaker. A pacemaker is an implanted device that regulates your heartbeat electronically. It monitors your heart's rhythm and generates a painless electric impulse that helps the heart beat in a regular rhythm. A pacemaker is programmed to meet your specific heart rhythm needs.  · Biventricular pacing/cardiac resynchronization therapy. A type of pacemaker that paces both pumping chambers of the heart at the same time to coordinate contractions and to improve the heart's function. Some people with heart failure are candidates for this therapy.  · Implantable cardioverter defibrillator. A device similar to a pacemaker that senses when the heart is beating too fast and delivers an electrical shock to convert the fast rhythm to a normal rhythm. This can be a life saving device.  In severe cases  In more serious cases of heart failure when other treatments no longer work, other options may include:  · Ventricular assist devices (VADs).  These are mechanical devices used to take over the pumping function for one or both of the heart's ventricles, or pumping chambers. A VAD may be necessary when heart failure progresses to the point that medicines and other treatments no longer help. In some cases, a VAD may be used as a bridge to transplant.  · Heart transplant. This is replacing the diseased heart with a healthy one from a donor. This is an option for a few people who are very sick. A heart transplant is very serious and not an option for all patients. Your doctor can tell you more.  Date Last Reviewed: 3/20/2016  © 4294-7117 [employer]. [address] [place] [address], [place] [address]. All rights reserved. This information is not intended as a substitute for professional medical care. Always follow your healthcare professional's instructions.              Heart Failure: Tracking Your Weight  You have a condition called heart failure. When you have heart failure, a sudden weight gain or a steady rise in weight is a warning sign that your body is retaining too much water and salt. This could mean your heart failure is getting worse. If left untreated, it can cause problems for your lungs and result in shortness of breath. Weighing yourself each day is the best way to know if youre retaining water. If your weight goes up quickly, call your doctor. You will be given instructions on how to get rid of the excess water. You will likely need medicines and to avoid salt. This will help your heart work better.  Call your doctor if you gain more than 2 pounds in 1 day, more than 5 pounds in 1 week, or whatever weight gain you were told to report by your doctor. This is often a sign of worsening heart failure and needs to be evaluated and treated. Your doctor will tell you what to do next.   Tips for weighing yourself    · Weigh yourself at the same time each morning, wearing the same clothes. Weigh yourself after urinating and before eating.  · Use  the same scale each day. Make sure the numbers are easy to read. Put the scale on a flat, hard surface -- not on a rug or carpet.  · Do not stop weighing yourself. If you forget one day, weigh again the next morning.  How to use your weight chart  · Keep your weight chart near the scale. Write your weight on the chart as soon as you get off the scale.  · Fill in the month and the start date on the chart. Then write down your weight each day. Your chart will look like this:    · If you miss a day, leave the space blank. Weigh yourself the next day and write your weight in the next space.  · Take your weight chart with you when you go to see your doctor.  Date Last Reviewed: 3/20/2016  © 3550-6747 [employer]. [address] [place] [address], [place] [address]. All rights reserved. This information is not intended as a substitute for professional medical care. Always follow your healthcare professional's instructions.              Heart Failure: Warning Signs of a Flare-Up  You have a condition called heart failure. Once you have heart failure, flare-ups can happen. Below are signs that can mean your heart failure is getting worse. If you notice any of these warning signs, call your healthcare provider.  Swelling    · Your feet, ankles, or lower legs get puffier.  · You notice skin changes on your lower legs.  · Your shoes feel too tight.  · Your clothes are tighter in the waist.  · You have trouble getting rings on or off your fingers.  Shortness of breath  · You have to breathe harder even when youre doing your normal activities or when youre resting.  · You are short of breath walking up stairs or even short distances.  · You wake up at night short of breath or coughing.  · You need to use more pillows or sit up to sleep.  · You wake up tired or restless.  Other warning signs  · You feel weaker, dizzy, or more tired.  · You have chest pain or changes in your heartbeat.  · You have a cough that wont go  away.  · You cant remember things or dont feel like eating.  Tracking your weight  Gaining weight is often the first warning sign that heart failure is getting worse. Gaining even a few pounds can be a sign that your body is retaining excess water and salt. Weighing yourself each day in the morning after you urinate and before you eat, is the best way to know if you're retaining water. Get a scale that is easy to read and make sure you wear the same clothes and use the same scale every time you weigh. Your healthcare provider will show you how to track your weight. Call your doctor if you gain more than 2 pounds in 1 day, 5 pounds in 1 week, or whatever weight gain you were told to report by your doctor. This is often a sign of worsening heart failure and needs to be evaluated and treated before it compromises your breathing. Your doctor will tell you what to do next.    Date Last Reviewed: 3/15/2016  © 3308-4541 [employer]. [address] [place] [address], [place] [address]. All rights reserved. This information is not intended as a substitute for professional medical care. Always follow your healthcare professional's instructions.              Pneumonmia Discharge Instructions                Chronic [place]ey Disease Education             [facility] Sign-Up     Activating your [facility] account is as easy as 1-2-3!     1) Visit [email or web address], select Sign Up Now, enter this activation code and your date of birth, then select Next.  [email or web address]  Expires: 5/15/2017  7:58 AM      2) Create a username and password to use when you visit [facility] in the future and select a security question in case you lose your password and select Next.    3) Enter your e-mail address and click Sign Up!    Additional Information  If you have questions, please e-mail [email or web address] or call [phone] to talk to our [facility] staff. Remember, [facility] is NOT to be used for urgent needs. For medical emergencies, dial  911.          [facility] complies with applicable Federal civil rights laws and does not discriminate on the basis of race, color, national origin, age, disability, or sex.

## 2017-03-29 NOTE — ASSESSMENT & PLAN NOTE
-IV Lasix  -elevated BNP noted  -supplemental oxygen  -strict I/Os  -daily weights  -fluid restriction  -Echo results pending- last Echo showed EF 55% with diastolic dysfunction and PAP 46

## 2017-03-29 NOTE — ED PROVIDER NOTES
SCRIBE #1 NOTE: I, Colton Ramesh, am scribing for, and in the presence of, Paramjit Billingsley MD. I have scribed the entire note.      History      Chief Complaint   Patient presents with    Shortness of Breath     x 2 weeks hx of CHF, pt reports he cannot walk without becoming very short of breath       Review of patient's allergies indicates:  No Known Allergies     HPI   HPI    3/29/2017, 8:59 AM   History obtained from the patient      History of Present Illness: Refugio Ferraro is a 89 y.o. male patient who presents to the Emergency Department for SOB  which onset gradually yesterday. Symptoms are constant and moderate in severity. Sx are exacerbated with exertion/laying flat and relieved by nothing. Associated sxs include cough and leg swelling. Pt states he had a dry hacking cough for 2 weeks and reports using robitussin with no relief. Pt reports taking mucinex last PM and reports productive cough this AM. Pt states he is on oxygen at night. Pt states he will often use his oxygen during the day if his oxygen sats are in the 90s. Patient denies any fever, N/V/D, chills, CP, chest tightness, palpitations, abd pain, wheezing, lightheadedness, dizziness and all other sxs at this time. No further complaints or concerns at this time.     Arrival mode: Personal vehicle     PCP: Blaire Kent MD       Past Medical History:  Past Medical History:   Diagnosis Date    *Atrial fibrillation     Acute decompensated heart failure 4/8/2014    Anticoagulant long-term use     aortic stenosis     degenerative- trileaflets    Aortic stenosis, severe 4/9/2014    Aortic valve disorders 5/13/2014    Arthritis     Asthma     Atrial fibrillation with RVR 1/30/2014    Atrial fibrillation, permanent 8/7/2013    Cancer 1970s, 2006, 2009    skin-removed from nose    Cardiomyopathy     Carotid atherosclerosis 6/27/2016    CHF (congestive heart failure)     CKD (chronic kidney disease) stage 3, GFR 30-59 ml/min      Coronary artery disease     Coronary artery disease involving native coronary artery of native heart without angina pectoris 10/29/2014    Per patient s/p stents x2     DDD (degenerative disc disease), cervical     Heart murmur     History of coronary angioplasty 10/3/2014    Hypertension     Hypotension, postural 8/7/2013    Pleural effusion     x3 total Thoracentesis 3/11/14 and 2/14/14     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    Severe aortic stenosis 4/9/2014    Syncope and collapse 2/3/2014    Tobacco dependence     resolved       Past Surgical History:  Past Surgical History:   Procedure Laterality Date    CARDIAC CATHETERIZATION  4/11/14    s/p ostial LM HORACIO, mid-RCA HORACIO    CARDIAC VALVE SURGERY  05/2014    aortic     CATARACT EXTRACTION Bilateral     EYE SURGERY Bilateral     laser    ruptured tendon      right arm    SKIN BIOPSY      SKIN CANCER EXCISION      per patient nose x3    THORACENTESIS      x3         Family History:  Family History   Problem Relation Age of Onset    Heart disease Father     Heart attack Brother     Heart disease Brother     Heart attack Brother     Stroke Brother     Heart disease Brother     Hypertension Brother     Heart attack Sister     Heart disease Sister     Emphysema Sister     Pulmonary embolism Mother     No Known Problems Brother     Anemia Neg Hx     Arrhythmia Neg Hx     Asthma Neg Hx     Clotting disorder Neg Hx     Fainting Neg Hx     Heart failure Neg Hx     Hyperlipidemia Neg Hx     Atrial Septal Defect Neg Hx     Cancer Neg Hx     Diabetes Neg Hx     Kidney disease Neg Hx        Social History:  Social History     Social History Main Topics    Smoking status: Former Smoker     Packs/day: 1.00     Years: 25.00     Quit date: 8/7/1968    Smokeless tobacco: Never Used    Alcohol use No    Drug use: No    Sexual activity: No       ROS   Review of Systems   Constitutional: Negative for chills and  fever.   HENT: Negative for congestion and sore throat.    Respiratory: Positive for cough and shortness of breath. Negative for chest tightness.    Cardiovascular: Positive for leg swelling. Negative for chest pain and palpitations.   Gastrointestinal: Negative for abdominal pain, nausea and vomiting.   Genitourinary: Negative for difficulty urinating and dysuria.   Musculoskeletal: Negative for back pain and neck pain.   Skin: Negative for rash.   Neurological: Negative for dizziness, weakness, light-headedness, numbness and headaches.   Psychiatric/Behavioral: Negative for agitation and confusion.   All other systems reviewed and are negative.      Physical Exam    Initial Vitals   BP Pulse Resp Temp SpO2   03/29/17 0848 03/29/17 0848 03/29/17 0848 03/29/17 0848 03/29/17 0848   122/65 90 18 99.6 °F (37.6 °C) 83 %      Physical Exam  Nursing Notes and Vital Signs Reviewed.  Constitutional: Patient is in moderate distress. Awake and alert. Well-developed and well-nourished.  Head: Atraumatic. Normocephalic.  Eyes: PERRL. EOM intact. Conjunctivae are not pale. No scleral icterus.  ENT: Mucous membranes are moist. Oropharynx is clear and symmetric.    Neck: Supple. Full ROM. No lymphadenopathy.  Cardiovascular: Regular rate. Regular rhythm. No murmurs, rubs, or gallops. Distal pulses are 2+ and symmetric.  Pulmonary/Chest: Moderate respiratory distress. Crackles noted to bilateral bases. No wheezing or rhonchi appreciated.  Abdominal: Soft and non-distended.  There is no tenderness.  No rebound, guarding, or rigidity. Good bowel sounds.  Musculoskeletal: Moves all extremities. No obvious deformities. Trace edema noted to BLE. No calf tenderness.  Skin: Warm and dry.  Neurological:  Alert, awake, and appropriate.  Normal speech.  No acute focal neurological deficits are appreciated.  Psychiatric: Normal affect. Good eye contact. Appropriate in content.    ED Course    Procedures  ED Vital Signs:  Vitals:    03/29/17  "0848 03/29/17 0909 03/29/17 0929   BP: 122/65     Pulse: 90 89    Resp: 18     Temp: 99.6 °F (37.6 °C)  99.6 °F (37.6 °C)   TempSrc: Oral     SpO2: (!) 83% (!) 92%    Weight: 88.5 kg (195 lb)     Height: 5' 6" (1.676 m)         Abnormal Lab Results:  Labs Reviewed   B-TYPE NATRIURETIC PEPTIDE - Abnormal; Notable for the following:        Result Value     (*)     All other components within normal limits   APTT - Abnormal; Notable for the following:     aPTT 33.3 (*)     All other components within normal limits   PROTIME-INR - Abnormal; Notable for the following:     Prothrombin Time 13.7 (*)     INR 1.3 (*)     All other components within normal limits   CBC W/ AUTO DIFFERENTIAL - Abnormal; Notable for the following:     WBC 17.17 (*)     Hemoglobin 13.1 (*)     RDW 16.6 (*)     Gran # 14.3 (*)     Lymph # 0.7 (*)     Mono # 2.1 (*)     Gran% 84.0 (*)     Lymph% 4.1 (*)     All other components within normal limits   CK-MB - Abnormal; Notable for the following:      (*)     All other components within normal limits   COMPREHENSIVE METABOLIC PANEL - Abnormal; Notable for the following:     Glucose 136 (*)     BUN, Bld 25 (*)     Albumin 3.3 (*)     Total Bilirubin 1.3 (*)     eGFR if  51 (*)     eGFR if non  44 (*)     All other components within normal limits   CK - Abnormal; Notable for the following:      (*)     All other components within normal limits   PHOSPHORUS - Abnormal; Notable for the following:     Phosphorus 2.3 (*)     All other components within normal limits   D DIMER, QUANTITATIVE - Abnormal; Notable for the following:     D-Dimer 0.89 (*)     All other components within normal limits   ISTAT PROCEDURE - Abnormal; Notable for the following:     POC PO2 40 (*)     POC SATURATED O2 77 (*)     All other components within normal limits   CULTURE, BLOOD   CULTURE, BLOOD   LACTIC ACID, PLASMA   INFLUENZA A AND B ANTIGEN   TSH   TROPONIN I   MAGNESIUM "        All Lab Results:  Results for orders placed or performed during the hospital encounter of 03/29/17   Brain natriuretic peptide   Result Value Ref Range     (H) 0 - 99 pg/mL   Lactic acid, plasma   Result Value Ref Range    Lactate (Lactic Acid) 1.2 0.5 - 2.2 mmol/L   Influenza antigen Nasopharyngeal Swab   Result Value Ref Range    Influenza A Ag, EIA Negative Negative    Influenza B Ag, EIA Negative Negative    Flu A & B Source Nasopharyngeal Swab    APTT   Result Value Ref Range    aPTT 33.3 (H) 21.0 - 32.0 sec   Protime-INR   Result Value Ref Range    Prothrombin Time 13.7 (H) 9.0 - 12.5 sec    INR 1.3 (H) 0.8 - 1.2   CBC auto differential   Result Value Ref Range    WBC 17.17 (H) 3.90 - 12.70 K/uL    RBC 4.84 4.60 - 6.20 M/uL    Hemoglobin 13.1 (L) 14.0 - 18.0 g/dL    Hematocrit 40.5 40.0 - 54.0 %    MCV 84 82 - 98 fL    MCH 27.1 27.0 - 31.0 pg    MCHC 32.3 32.0 - 36.0 %    RDW 16.6 (H) 11.5 - 14.5 %    Platelets 241 150 - 350 K/uL    MPV 9.7 9.2 - 12.9 fL    Gran # 14.3 (H) 1.8 - 7.7 K/uL    Lymph # 0.7 (L) 1.0 - 4.8 K/uL    Mono # 2.1 (H) 0.3 - 1.0 K/uL    Eos # 0.0 0.0 - 0.5 K/uL    Baso # 0.02 0.00 - 0.20 K/uL    Gran% 84.0 (H) 38.0 - 73.0 %    Lymph% 4.1 (L) 18.0 - 48.0 %    Mono% 12.2 4.0 - 15.0 %    Eosinophil% 0.1 0.0 - 8.0 %    Basophil% 0.1 0.0 - 1.9 %    Ovalocytes Occasional     Stomatocytes Present     Differential Method Automated    CK-MB   Result Value Ref Range     (H) 20 - 200 U/L    CPK MB 1.6 0.1 - 6.5 ng/mL    MB% 0.8 0.0 - 5.0 %   Comprehensive metabolic panel   Result Value Ref Range    Sodium 140 136 - 145 mmol/L    Potassium 4.7 3.5 - 5.1 mmol/L    Chloride 100 95 - 110 mmol/L    CO2 29 23 - 29 mmol/L    Glucose 136 (H) 70 - 110 mg/dL    BUN, Bld 25 (H) 8 - 23 mg/dL    Creatinine 1.4 0.5 - 1.4 mg/dL    Calcium 9.4 8.7 - 10.5 mg/dL    Total Protein 7.4 6.0 - 8.4 g/dL    Albumin 3.3 (L) 3.5 - 5.2 g/dL    Total Bilirubin 1.3 (H) 0.1 - 1.0 mg/dL    Alkaline Phosphatase  77 55 - 135 U/L    AST 14 10 - 40 U/L    ALT 14 10 - 44 U/L    Anion Gap 11 8 - 16 mmol/L    eGFR if African American 51 (A) >60 mL/min/1.73 m^2    eGFR if non African American 44 (A) >60 mL/min/1.73 m^2   CK   Result Value Ref Range     (H) 20 - 200 U/L   TSH   Result Value Ref Range    TSH 0.616 0.400 - 4.000 uIU/mL   Troponin I   Result Value Ref Range    Troponin I 0.018 0.000 - 0.026 ng/mL   Magnesium   Result Value Ref Range    Magnesium 1.8 1.6 - 2.6 mg/dL   Phosphorus   Result Value Ref Range    Phosphorus 2.3 (L) 2.7 - 4.5 mg/dL   D dimer, quantitative   Result Value Ref Range    D-Dimer 0.89 (H) <0.50 mg/L FEU   ISTAT PROCEDURE   Result Value Ref Range    POC PH 7.434 7.35 - 7.45    POC PCO2 39.8 35 - 45 mmHg    POC PO2 40 (LL) 80 - 100 mmHg    POC HCO3 26.7 24 - 28 mmol/L    POC BE 2 -2 to 2 mmol/L    POC SATURATED O2 77 (L) 95 - 100 %    Sample ARTERIAL     Site LR     Allens Test Pass     DelSys Room Air     Mode SPONT     FiO2 21          Imaging Results:  Imaging Results         NM Lung Ventilation Perfusion Imaging (In process)         X-Ray Chest 1 View (Final result) Result time:  03/29/17 10:09:13    Final result by SPEEDY Harris Sr., MD (03/29/17 10:09:13)    Impression:        1.  There is no evidence of an acute pulmonary process.    2.  There is silhouetting of the inferior aspect of the left border of the heart.  This is characteristic of a pericardial fat pad.  3.  There are healed fractures on the left side of the thoracic cage.        Electronically signed by: SPEEDY HARRIS MD  Date:     03/29/17  Time:    10:09     Narrative:    One-view chest x-ray    Clinical History: Shortness of breath    Finding: Comparison was made to a prior examination performed on 4/19/2016.  There is silhouetting of the inferior aspect of the left border of the heart.  There is no evidence of an acute pulmonary process.  There is no pneumothorax.  The costophrenic angles are sharp.  There are healed  fractures on the left side of the thoracic cage.               The EKG was ordered, reviewed, and independently interpreted by the ED provider.  Interpretation time: 0907  Rate: 90 BPM  Rhythm: sinus rhythm with premature atrial complexes  Interpretation: No STEMI.         The Emergency Provider reviewed the vital signs and test results, which are outlined above.    ED Discussion     10:46 AM: Re-evaluated pt. I have discussed test results, shared treatment plan, and the need for admission with patient and family at bedside. Pt and family express understanding at this time and agree with all information. All questions answered. Pt and family have no further questions or concerns at this time. Pt is ready for admit.    11:18 AM: Discussed case with Renea ([facility]). Dr. Mckinnon agrees with current care and management of pt and accepts admission.   Admitting Service: Hospital medicine   Admitting Physician: Pratibha  Admit to: Tele    ED Medication(s):  Medications   furosemide injection 60 mg (not administered)   enoxaparin injection 90 mg (not administered)   acetaminophen tablet 650 mg (650 mg Oral Given 3/29/17 0929)       New Prescriptions    No medications on file             Medical Decision Making    Medical Decision Making:   Clinical Tests:   Lab Tests: Ordered and Reviewed  Radiological Study: Reviewed and Ordered  Medical Tests: Ordered and Reviewed           Scribe Attestation:   Scribe #1: I performed the above scribed service and the documentation accurately describes the services I performed. I attest to the accuracy of the note.    Attending:   Physician Attestation Statement for Scribe #1: I, Paramjit Billingsley MD, personally performed the services described in this documentation, as scribed by Colton Ramesh, in my presence, and it is both accurate and complete.          Clinical Impression       ICD-10-CM ICD-9-CM   1. SOB (shortness of breath) R06.02 786.05   2.       Pneumonia - Bilateral [place], unspecified organism  3.      Acute on Chronic CHF exacerbation    Disposition:   Disposition: Admitted (Tele)  Condition: Fair         Paramjit Billingsley MD  03/30/17 6289

## 2017-03-29 NOTE — H&P
[facility] Hospital Medicine  History & Physical    Patient Name: Refugio Ferraro  MRN: [id number]  Admission Date: 3/29/2017  Attending Physician: Galindo Mckinnon MD   Primary Care Provider: Blaire Kent MD         Patient information was obtained from patient and ER records.     Subjective:     Principal Problem:Bilateral pneumonia    Chief Complaint:   Chief Complaint   Patient presents with    Shortness of Breath     x 2 weeks hx of CHF, pt reports he cannot walk without becoming very short of breath        HPI:  Refugio Ferraro is a 89 y.o. male patient who presents to the Emergency Department for SOB  which onset gradually yesterday. Symptoms are constant and moderate in severity. Sx are exacerbated with exertion/laying flat and relieved by nothing. Associated sxs include cough and leg swelling. Pt states he had a dry hacking cough for 2 weeks and reports using robitussin with no relief. Pt reports taking mucinex last PM and reports productive cough this AM. Pt states he is on oxygen at night. Pt states he will often use his oxygen during the day if his oxygen sats are in the 90s. Patient denies any fever, N/V/D, chills, CP, chest tightness, palpitations, abd pain, wheezing, lightheadedness, dizziness and all other sxs at this time. No further complaints or concerns at this time.     Past Medical History:   Diagnosis Date    *Atrial fibrillation     Acute decompensated heart failure 4/8/2014    Anticoagulant long-term use     aortic stenosis     degenerative- trileaflets    Aortic stenosis, severe 4/9/2014    Aortic valve disorders 5/13/2014    Arthritis     Asthma     Atrial fibrillation with RVR 1/30/2014    Atrial fibrillation, permanent 8/7/2013    Cancer 1970s, 2006, 2009    skin-removed from nose    Cardiomyopathy     Carotid atherosclerosis 6/27/2016    CHF (congestive heart failure)     CKD (chronic kidney disease) stage 3, GFR 30-59 ml/min     Coronary artery  disease     Coronary artery disease involving native coronary artery of native heart without angina pectoris 10/29/2014    Per patient s/p stents x2     DDD (degenerative disc disease), cervical     Heart murmur     History of coronary angioplasty 10/3/2014    Hypertension     Hypotension, postural 8/7/2013    Pleural effusion     x3 total Thoracentesis 3/11/14 and 2/14/14     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    Severe aortic stenosis 4/9/2014    Syncope and collapse 2/3/2014    Tobacco dependence     resolved       Past Surgical History:   Procedure Laterality Date    CARDIAC CATHETERIZATION  4/11/14    s/p ostial LM HORACIO, mid-RCA HORACIO    CARDIAC VALVE SURGERY  05/2014    aortic     CATARACT EXTRACTION Bilateral     EYE SURGERY Bilateral     laser    ruptured tendon      right arm    SKIN BIOPSY      SKIN CANCER EXCISION      per patient nose x3    THORACENTESIS      x3       Review of patient's allergies indicates:  No Known Allergies    No current facility-administered medications on file prior to encounter.      Current Outpatient Prescriptions on File Prior to Encounter   Medication Sig    aspirin (ECOTRIN) 81 MG EC tablet Take 81 mg by mouth every evening.    atenolol (TENORMIN) 25 MG tablet TAKE 2 TABLETS ONE TIME DAILY    atorvastatin (LIPITOR) 80 MG tablet TAKE 1 TABLET ONE TIME DAILY    CARTIA  mg Cp24 TAKE 1 CAPSULE EVERY DAY    clopidogrel (PLAVIX) 75 mg tablet TAKE 1 TABLET ONE TIME DAILY    furosemide (LASIX) 40 MG tablet TAKE 1 TABLET EVERY DAY    isosorbide mononitrate (IMDUR) 120 MG 24 hr tablet TAKE 1 TABLET EVERY DAY    MULTIVITAMIN W-MINERALS/LUTEIN (CENTRUM SILVER ORAL) Take by mouth.    pantoprazole (PROTONIX) 40 MG tablet TAKE 1 TABLET ONE TIME DAILY    potassium chloride SA (K-DUR,KLOR-CON) 20 MEQ tablet TAKE 1 TABLET ONE TIME DAILY    ranolazine (RANEXA) 500 MG Tb12 Take 1 tablet (500 mg total) by mouth 2 (two) times daily.     tamsulosin (FLOMAX) 0.4 mg Cp24 Take 1 capsule (0.4 mg total) by mouth once daily.    albuterol-ipratropium 2.5mg-0.5mg/3mL (DUO-NEB) 0.5 mg-3 mg(2.5 mg base)/3 mL nebulizer solution Take 3 mLs by nebulization every 6 (six) hours. (Patient taking differently: Take 3 mLs by nebulization 2 (two) times daily. )    co-enzyme Q-10 50 mg capsule Take 50 mg by mouth once daily.    GLUCOSAMINE HCL/CHONDR HOLT A NA (OSTEO BI-FLEX ORAL) Take by mouth 2 (two) times daily.    magnesium oxide (MAG-OX) 400 mg tablet Take 400 mg by mouth 2 (two) times daily.      Family History     Problem Relation (Age of Onset)    Emphysema Sister    Heart attack Brother, Brother, Sister    Heart disease Father, Brother, Brother, Sister    Hypertension Brother    No Known Problems Brother    Pulmonary embolism Mother    Stroke Brother        Social History Main Topics    Smoking status: Former Smoker     Packs/day: 1.00     Years: 25.00     Quit date: 8/7/1968    Smokeless tobacco: Never Used    Alcohol use No    Drug use: No    Sexual activity: No     Review of Systems   Constitutional: Positive for fever.   HENT: Positive for congestion. Negative for sinus pressure, sneezing, sore throat and trouble swallowing.    Eyes: Negative for redness, itching and visual disturbance.   Respiratory: Positive for cough, shortness of breath and wheezing.    Cardiovascular: Positive for leg swelling. Negative for chest pain and palpitations.   Gastrointestinal: Negative for abdominal pain, diarrhea, nausea and vomiting.   Endocrine: Negative for cold intolerance and heat intolerance.   Genitourinary: Negative for difficulty urinating, dysuria, flank pain, frequency and urgency.   Musculoskeletal: Negative for arthralgias, back pain and myalgias.   Skin: Negative for color change and wound.   Allergic/Immunologic: Negative for environmental allergies and food allergies.   Neurological: Positive for weakness. Negative for dizziness, syncope and  headaches.   Hematological: Does not bruise/bleed easily.   Psychiatric/Behavioral: Negative for agitation, confusion and sleep disturbance. The patient is not nervous/anxious.      Objective:     Vital Signs (Most Recent):  Temp: 98.7 °F (37.1 °C) (03/29/17 1624)  Pulse: 87 (03/29/17 1624)  Resp: (!) 22 (03/29/17 1624)  BP: (!) 114/55 (03/29/17 1624)  SpO2: (!) 92 % (03/29/17 1624) Vital Signs (24h Range):  Temp:  [98.7 °F (37.1 °C)-99.6 °F (37.6 °C)] 98.7 °F (37.1 °C)  Pulse:  [87-95] 87  Resp:  [18-22] 22  SpO2:  [83 %-93 %] 92 %  BP: (109-122)/(52-65) 114/55     Weight: 87.3 kg (192 lb 6.4 oz)  Body mass index is 31.05 kg/(m^2).    Physical Exam   Constitutional: He is oriented to person, place, and time. He appears well-developed and well-nourished.   HENT:   Head: Normocephalic.   Nose: Nose normal.   Mouth/Throat: Oropharynx is clear and moist.   Eyes: Conjunctivae and EOM are normal.   Neck: Normal range of motion.   Cardiovascular: Normal rate, regular rhythm, normal heart sounds and intact distal pulses.  Exam reveals no friction rub.    Pulmonary/Chest: Tachypnea noted. He has wheezes in the right upper field and the left upper field. He has rales in the right lower field and the left lower field.   Abdominal: Soft. Bowel sounds are normal. He exhibits no distension. There is no tenderness.   Musculoskeletal: Normal range of motion.   Neurological: He is alert and oriented to person, place, and time.   Skin: Skin is warm and dry.   Psychiatric: He has a normal mood and affect. His behavior is normal. Thought content normal.        Significant Labs:   CBC:   Recent Labs  Lab 03/29/17  0915   WBC 17.17*   HGB 13.1*   HCT 40.5        CMP:   Recent Labs  Lab 03/29/17  0915      K 4.7      CO2 29   *   BUN 25*   CREATININE 1.4   CALCIUM 9.4   PROT 7.4   ALBUMIN 3.3*   BILITOT 1.3*   ALKPHOS 77   AST 14   ALT 14   ANIONGAP 11   EGFRNONAA 44*       Significant Imaging:   Imaging Results          CTA Chest Non Coronary (Final result) Result time:  03/29/17 12:25:53    Final result by Cam Palacios MD (03/29/17 12:25:53)    Impression:        1. No pulmonary embolus.   2.  Bilateral lower lobe pneumonia, much worse in the right lung, with some reactive right hilar lymphadenopathy.        All CT scans at this facility use dose modulation, iterative reconstruction and/or weight based dosing when appropriate to reduce radiation dose to as low as reasonably achievable.      Electronically signed by: CAM PALACIOS MD  Date:     03/29/17  Time:    12:25     Narrative:    Exam: CTA chest with intravenous contrast.    Clinical History: Altered shortness of breath.    Technique: Axial CTA images performed through the chest after the administration of 100 cc intravenous contrast. 3D MIP images were performed and interpreted.    Findings:        There is no evidence of pulmonary embolus. Pulmonary artery caliber is normal. The patient has had an aortic valve repair.  There is calcific atherosclerotic disease of the coronary arteries.  The heart size is enlarged.  There is no thoracic aortic aneurysm or dissection. There is some mild reactive right hilar lymphadenopathy.    There is patchy consolidation throughout the entirety of the right lower lobe.  There is bronchial wall thickening and some bronchovascular interstitial infiltrate in the left lower lobe.  The upper lobes and right middle lobe are clear. No pleural effusion or pneumothorax.    There are calcified gallstones.    There are old left-sided rib fractures.            X-Ray Chest 1 View (Final result) Result time:  03/29/17 10:09:13    Final result by SPEEDY Harris Sr., MD (03/29/17 10:09:13)    Impression:        1.  There is no evidence of an acute pulmonary process.    2.  There is silhouetting of the inferior aspect of the left border of the heart.  This is characteristic of a pericardial fat pad.  3.  There are healed fractures on the left  side of the thoracic cage.        Electronically signed by: SPEEDY KC MD  Date:     03/29/17  Time:    10:09     Narrative:    One-view chest x-ray    Clinical History: Shortness of breath    Finding: Comparison was made to a prior examination performed on 4/19/2016.  There is silhouetting of the inferior aspect of the left border of the heart.  There is no evidence of an acute pulmonary process.  There is no pneumothorax.  The costophrenic angles are sharp.  There are healed fractures on the left side of the thoracic cage.            Assessment/Plan:     * Bilateral pneumonia  -Pt admitted to Telemetry Unit   -IVF  -IV antibiotics (Azithromycin and Rocephin)  -supplemental oxygen  -lactate 1.2  -will follow blood culture results    Asthma with COPD  -supplemental oxygen  -Duonebs prn     AF (paroxysmal atrial fibrillation)  -SR on monitor   - Atenolol, statin, and Diltiazem continued  -ASA and Plavix continued  -INR 1.3    Essential hypertension  -home medications resumed  -Imdur and Randolazine     Acute on chronic diastolic congestive heart failure  -IV Lasix  -elevated BNP noted  -supplemental oxygen  -strict I/Os  -daily weights  -fluid restriction  -Echo results pending- last Echo showed EF 55% with diastolic dysfunction and PAP 46    Leukocytosis  -likely due to infectious origin  -IV antibiotics  -will follow repeat CBC in am    Acute on chronic respiratory failure  -improved  -supplemental oxygen  -Duonebs prn   -pt wears home oxygen at night-2L    VTE Risk Mitigation     None        Gabbie Virk NP  Department of Hospital Medicine   [facility]

## 2017-03-29 NOTE — NURSING
Pt admitted to room 210, via stretcher from ED in no apparent distress. Assisted from stretcher to bed without issue.  Bedside report given by benita Gottlieb, no further questions at this time.  Oriented pt to room, bed, call light, and room service.  Assessment documented.   Tele monitor placed with rhythm verification by tele monitor tech.  Bed in low position, side rails up x2, call light in reach, family at bedside.

## 2017-03-30 LAB
ANION GAP SERPL CALC-SCNC: 14 MMOL/L
BASOPHILS # BLD AUTO: 0.01 K/UL
BASOPHILS NFR BLD: 0.1 %
BUN SERPL-MCNC: 27 MG/DL
CALCIUM SERPL-MCNC: 9 MG/DL
CHLORIDE SERPL-SCNC: 98 MMOL/L
CO2 SERPL-SCNC: 29 MMOL/L
CREAT SERPL-MCNC: 1.5 MG/DL
DIASTOLIC DYSFUNCTION: YES
DIFFERENTIAL METHOD: ABNORMAL
EOSINOPHIL # BLD AUTO: 0.1 K/UL
EOSINOPHIL NFR BLD: 0.6 %
ERYTHROCYTE [DISTWIDTH] IN BLOOD BY AUTOMATED COUNT: 16.6 %
EST. GFR  (AFRICAN AMERICAN): 47 ML/MIN/1.73 M^2
EST. GFR  (NON AFRICAN AMERICAN): 40 ML/MIN/1.73 M^2
ESTIMATED PA SYSTOLIC PRESSURE: 51.55
GLUCOSE SERPL-MCNC: 125 MG/DL
HCT VFR BLD AUTO: 41 %
HGB BLD-MCNC: 13.2 G/DL
LYMPHOCYTES # BLD AUTO: 0.7 K/UL
LYMPHOCYTES NFR BLD: 5.4 %
MCH RBC QN AUTO: 27.1 PG
MCHC RBC AUTO-ENTMCNC: 32.2 %
MCV RBC AUTO: 84 FL
MONOCYTES # BLD AUTO: 1.4 K/UL
MONOCYTES NFR BLD: 10.4 %
NEUTROPHILS # BLD AUTO: 11.4 K/UL
NEUTROPHILS NFR BLD: 83.5 %
PLATELET # BLD AUTO: 245 K/UL
PMV BLD AUTO: 9.7 FL
POTASSIUM SERPL-SCNC: 3.7 MMOL/L
RBC # BLD AUTO: 4.87 M/UL
RETIRED EF AND QEF - SEE NOTES: 60 (ref 55–65)
SODIUM SERPL-SCNC: 141 MMOL/L
WBC # BLD AUTO: 13.69 K/UL

## 2017-03-30 PROCEDURE — 94640 AIRWAY INHALATION TREATMENT: CPT

## 2017-03-30 PROCEDURE — 25000003 PHARM REV CODE 250: Performed by: NURSE PRACTITIONER

## 2017-03-30 PROCEDURE — 92610 EVALUATE SWALLOWING FUNCTION: CPT

## 2017-03-30 PROCEDURE — 93306 TTE W/DOPPLER COMPLETE: CPT

## 2017-03-30 PROCEDURE — 25000242 PHARM REV CODE 250 ALT 637 W/ HCPCS: Performed by: NURSE PRACTITIONER

## 2017-03-30 PROCEDURE — 25000003 PHARM REV CODE 250: Performed by: EMERGENCY MEDICINE

## 2017-03-30 PROCEDURE — 94799 UNLISTED PULMONARY SVC/PX: CPT

## 2017-03-30 PROCEDURE — 94664 DEMO&/EVAL PT USE INHALER: CPT

## 2017-03-30 PROCEDURE — 63600175 PHARM REV CODE 636 W HCPCS: Performed by: INTERNAL MEDICINE

## 2017-03-30 PROCEDURE — 21400001 HC TELEMETRY ROOM

## 2017-03-30 PROCEDURE — 25000003 PHARM REV CODE 250: Performed by: INTERNAL MEDICINE

## 2017-03-30 PROCEDURE — 80048 BASIC METABOLIC PNL TOTAL CA: CPT

## 2017-03-30 PROCEDURE — 27000221 HC OXYGEN, UP TO 24 HOURS

## 2017-03-30 PROCEDURE — 36415 COLL VENOUS BLD VENIPUNCTURE: CPT

## 2017-03-30 PROCEDURE — 63600175 PHARM REV CODE 636 W HCPCS: Performed by: EMERGENCY MEDICINE

## 2017-03-30 PROCEDURE — 85025 COMPLETE CBC W/AUTO DIFF WBC: CPT

## 2017-03-30 PROCEDURE — 93306 TTE W/DOPPLER COMPLETE: CPT | Mod: 26,,, | Performed by: INTERNAL MEDICINE

## 2017-03-30 RX ORDER — DILTIAZEM HYDROCHLORIDE 120 MG/1
120 CAPSULE, COATED, EXTENDED RELEASE ORAL DAILY
Status: DISCONTINUED | OUTPATIENT
Start: 2017-03-30 | End: 2017-03-31 | Stop reason: HOSPADM

## 2017-03-30 RX ORDER — TAMSULOSIN HYDROCHLORIDE 0.4 MG/1
1 CAPSULE ORAL DAILY
Status: DISCONTINUED | OUTPATIENT
Start: 2017-03-30 | End: 2017-03-31 | Stop reason: HOSPADM

## 2017-03-30 RX ORDER — SODIUM,POTASSIUM PHOSPHATES 280-250MG
1 POWDER IN PACKET (EA) ORAL
Status: DISCONTINUED | OUTPATIENT
Start: 2017-03-30 | End: 2017-03-31 | Stop reason: HOSPADM

## 2017-03-30 RX ORDER — NEOMYCIN SULFATE, POLYMYXIN B SULFATE AND DEXAMETHASONE 3.5; 10000; 1 MG/ML; [USP'U]/ML; MG/ML
1 SUSPENSION/ DROPS OPHTHALMIC EVERY 6 HOURS
Status: DISCONTINUED | OUTPATIENT
Start: 2017-03-30 | End: 2017-03-31 | Stop reason: HOSPADM

## 2017-03-30 RX ORDER — HEPARIN SODIUM 5000 [USP'U]/ML
5000 INJECTION, SOLUTION INTRAVENOUS; SUBCUTANEOUS EVERY 8 HOURS
Status: DISCONTINUED | OUTPATIENT
Start: 2017-03-30 | End: 2017-03-31 | Stop reason: HOSPADM

## 2017-03-30 RX ORDER — ATENOLOL 50 MG/1
50 TABLET ORAL DAILY
Status: DISCONTINUED | OUTPATIENT
Start: 2017-03-30 | End: 2017-03-31 | Stop reason: HOSPADM

## 2017-03-30 RX ADMIN — TAMSULOSIN HYDROCHLORIDE 0.4 MG: 0.4 CAPSULE ORAL at 07:03

## 2017-03-30 RX ADMIN — FUROSEMIDE 60 MG: 10 INJECTION, SOLUTION INTRAMUSCULAR; INTRAVENOUS at 06:03

## 2017-03-30 RX ADMIN — CEFTRIAXONE 1 G: 1 INJECTION, SOLUTION INTRAVENOUS at 12:03

## 2017-03-30 RX ADMIN — NIFEDIPINE 120 MG: 10 CAPSULE ORAL at 10:03

## 2017-03-30 RX ADMIN — NEOMYCIN SULFATE, POLYMYXIN B SULFATE AND DEXAMETHASONE 1 DROP: 3.5; 10000; 1 SUSPENSION/ DROPS OPHTHALMIC at 10:03

## 2017-03-30 RX ADMIN — IPRATROPIUM BROMIDE AND ALBUTEROL SULFATE 3 ML: .5; 3 SOLUTION RESPIRATORY (INHALATION) at 08:03

## 2017-03-30 RX ADMIN — RANOLAZINE 500 MG: 500 TABLET, FILM COATED, EXTENDED RELEASE ORAL at 10:03

## 2017-03-30 RX ADMIN — HEPARIN SODIUM 5000 UNITS: 5000 INJECTION, SOLUTION INTRAVENOUS; SUBCUTANEOUS at 02:03

## 2017-03-30 RX ADMIN — IPRATROPIUM BROMIDE AND ALBUTEROL SULFATE 3 ML: .5; 3 SOLUTION RESPIRATORY (INHALATION) at 07:03

## 2017-03-30 RX ADMIN — NEOMYCIN SULFATE, POLYMYXIN B SULFATE AND DEXAMETHASONE 1 DROP: 3.5; 10000; 1 SUSPENSION/ DROPS OPHTHALMIC at 02:03

## 2017-03-30 RX ADMIN — ATENOLOL 50 MG: 50 TABLET ORAL at 10:03

## 2017-03-30 RX ADMIN — ASPIRIN 81 MG: 81 TABLET, COATED ORAL at 10:03

## 2017-03-30 RX ADMIN — POTASSIUM & SODIUM PHOSPHATES POWDER PACK 280-160-250 MG 1 PACKET: 280-160-250 PACK at 07:03

## 2017-03-30 RX ADMIN — IPRATROPIUM BROMIDE AND ALBUTEROL SULFATE 3 ML: .5; 3 SOLUTION RESPIRATORY (INHALATION) at 02:03

## 2017-03-30 RX ADMIN — Medication 500 MG: at 02:03

## 2017-03-30 RX ADMIN — HEPARIN SODIUM 5000 UNITS: 5000 INJECTION, SOLUTION INTRAVENOUS; SUBCUTANEOUS at 10:03
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
## 2017-03-30 NOTE — PLAN OF CARE
03/30/17 1100   Discharge Assessment   Assessment Type Discharge Planning Assessment   Confirmed/corrected address and phone number on facesheet? Yes  (mailing address,currently living with son in [place])   Assessment information obtained from? Patient;Medical Record   Expected Length of Stay (days) (tbd)   Type of Healthcare Directive Received Durable power of  for health care;Advance Directive;Living will   If Healthcare Directive is received, is it scanned into Epic? yes   Prior to hospitilization cognitive status: Alert/Oriented   Prior to hospitalization functional status: Independent   Current cognitive status: Alert/Oriented   Current Functional Status: Independent   Arrived From home or self-care   Lives With child(marissa), adult   Able to Return to Prior Arrangements yes   Is patient able to care for self after discharge? Yes   How many people do you have in your home that can help with your care after discharge? 1   Who are your caregiver(s) and their phone number(s)? Jordan Ferraro ( son ) [phone]   Patient's perception of discharge disposition home health;home or selfcare   Readmission Within The Last 30 Days no previous admission in last 30 days   Patient currently being followed by outpatient case management? No   Patient currently receives home health services? No   Does the patient currently use HME? Yes   Patient currently receives private duty nursing? No   Patient currently receives any other outside agency services? No   Equipment Currently Used at Home oxygen   Do you have any problems affording any of your prescribed medications? Yes   If yes, what medications? Ranexa // pharmacy patient   assistanance referral placed   Is the patient taking medications as prescribed? yes   Do you have any financial concerns preventing you from receiving the healthcare you need? No   Does the patient have transportation to healthcare appointments? Yes   Transportation Available family or friend  will provide;car   On Dialysis? No   Does the patient receive services at the Coumadin Clinic? No   Are there any open cases? No   Discharge Plan A Home;Home with family   Discharge Plan B Home;Home with family;Home Health   Patient/Family In Agreement With Plan yes

## 2017-03-30 NOTE — PHYSICIAN QUERY
PT Name: Refugio Ferraro  MR #: [id number]     Physician Query Form - Documentation Clarification      CDS/: Ashley Crandall               Contact information:[phone]    This form is a permanent document in the medical record.     Query Date: March 30, 2017    By submitting this query, we are merely seeking further clarification of documentation. Please utilize your independent clinical judgment when addressing the question(s) below.    The Medical record reflects the following:    Supporting Clinical Findings Location in Medical Record   Acute on chronic respiratory failure       Moderate respiratory distress    PO2=40  PCO2=39.8    RR=18-22  Oxygen 4lnc  O2 sat 89-92%with oxygen  O2 sat 83% on room air H&P      ED MD    ABG 3-29      Vs record 3-29                                                                                      Doctor, Please specify diagnosis or diagnoses associated with above clinical findings.    Provider Use Only    Please further specify the Acute on chronic respiratory failure          [  X   ]  Acute on chronic respiratory failure with hypoxia       [     ]  Other___________________________                                                                                                             [  ] Clinically undetermined

## 2017-03-30 NOTE — PLAN OF CARE
Problem: Patient Care Overview  Goal: Plan of Care Review  Outcome: Ongoing (interventions implemented as appropriate)  Patient had uneventful shift. Patient awake, alert and oriented x 4. VS stable. Patient denies pain or SOB. Patient on telemetry, NSR on the monitor. Patient ambulates with assistance. Fall precautions in place. Bed alarm active and audible. Patient free from fall/injury. Plan of care reviewed with patient. Patient has no questions at this time. Will continue to monitor.

## 2017-03-31 VITALS
HEART RATE: 114 BPM | RESPIRATION RATE: 20 BRPM | BODY MASS INDEX: 30.91 KG/M2 | HEIGHT: 66 IN | TEMPERATURE: 98 F | SYSTOLIC BLOOD PRESSURE: 128 MMHG | WEIGHT: 192.31 LBS | DIASTOLIC BLOOD PRESSURE: 83 MMHG | OXYGEN SATURATION: 93 %

## 2017-03-31 DIAGNOSIS — I35.0 AORTIC VALVE STENOSIS, UNSPECIFIED ETIOLOGY: Primary | ICD-10-CM

## 2017-03-31 DIAGNOSIS — Z00.6 EXAMINATION OF PARTICIPANT IN CLINICAL TRIAL: ICD-10-CM

## 2017-03-31 LAB
ANION GAP SERPL CALC-SCNC: 15 MMOL/L
BASOPHILS # BLD AUTO: 0.03 K/UL
BASOPHILS NFR BLD: 0.2 %
BUN SERPL-MCNC: 27 MG/DL
CALCIUM SERPL-MCNC: 9.1 MG/DL
CHLORIDE SERPL-SCNC: 99 MMOL/L
CO2 SERPL-SCNC: 30 MMOL/L
CREAT SERPL-MCNC: 1.4 MG/DL
DIFFERENTIAL METHOD: ABNORMAL
EOSINOPHIL # BLD AUTO: 0.1 K/UL
EOSINOPHIL NFR BLD: 0.6 %
ERYTHROCYTE [DISTWIDTH] IN BLOOD BY AUTOMATED COUNT: 16.9 %
EST. GFR  (AFRICAN AMERICAN): 51 ML/MIN/1.73 M^2
EST. GFR  (NON AFRICAN AMERICAN): 44 ML/MIN/1.73 M^2
GLUCOSE SERPL-MCNC: 125 MG/DL
HCT VFR BLD AUTO: 42.4 %
HGB BLD-MCNC: 13.6 G/DL
LYMPHOCYTES # BLD AUTO: 0.7 K/UL
LYMPHOCYTES NFR BLD: 5.6 %
MCH RBC QN AUTO: 26.8 PG
MCHC RBC AUTO-ENTMCNC: 32.1 %
MCV RBC AUTO: 84 FL
MONOCYTES # BLD AUTO: 1.4 K/UL
MONOCYTES NFR BLD: 11.5 %
NEUTROPHILS # BLD AUTO: 10.1 K/UL
NEUTROPHILS NFR BLD: 82.1 %
PLATELET # BLD AUTO: 279 K/UL
PMV BLD AUTO: 9.9 FL
POTASSIUM SERPL-SCNC: 3.8 MMOL/L
RBC # BLD AUTO: 5.07 M/UL
SODIUM SERPL-SCNC: 144 MMOL/L
WBC # BLD AUTO: 12.3 K/UL

## 2017-03-31 PROCEDURE — 80048 BASIC METABOLIC PNL TOTAL CA: CPT

## 2017-03-31 PROCEDURE — 25000003 PHARM REV CODE 250: Performed by: EMERGENCY MEDICINE

## 2017-03-31 PROCEDURE — 99900035 HC TECH TIME PER 15 MIN (STAT)

## 2017-03-31 PROCEDURE — 94640 AIRWAY INHALATION TREATMENT: CPT

## 2017-03-31 PROCEDURE — 85025 COMPLETE CBC W/AUTO DIFF WBC: CPT

## 2017-03-31 PROCEDURE — 27000221 HC OXYGEN, UP TO 24 HOURS

## 2017-03-31 PROCEDURE — 25000003 PHARM REV CODE 250: Performed by: NURSE PRACTITIONER

## 2017-03-31 PROCEDURE — 25000242 PHARM REV CODE 250 ALT 637 W/ HCPCS: Performed by: NURSE PRACTITIONER

## 2017-03-31 PROCEDURE — 25000003 PHARM REV CODE 250: Performed by: INTERNAL MEDICINE

## 2017-03-31 PROCEDURE — 63600175 PHARM REV CODE 636 W HCPCS: Performed by: INTERNAL MEDICINE

## 2017-03-31 RX ORDER — BUTYROSPERMUM PARKII(SHEA BUTTER), SIMMONDSIA CHINENSIS (JOJOBA) SEED OIL, ALOE BARBADENSIS LEAF EXTRACT .01; 1; 3.5 G/100G; G/100G; G/100G
250 LIQUID TOPICAL 2 TIMES DAILY
Qty: 60 CAPSULE | Refills: 0 | Status: SHIPPED | OUTPATIENT
Start: 2017-03-31 | End: 2017-04-30

## 2017-03-31 RX ORDER — AZITHROMYCIN 250 MG/1
250 TABLET, FILM COATED ORAL DAILY
Qty: 5 TABLET | Refills: 0 | Status: SHIPPED | OUTPATIENT
Start: 2017-03-31 | End: 2017-04-05

## 2017-03-31 RX ORDER — FUROSEMIDE 40 MG/1
40 TABLET ORAL DAILY
Status: DISCONTINUED | OUTPATIENT
Start: 2017-03-31 | End: 2017-03-31 | Stop reason: HOSPADM

## 2017-03-31 RX ORDER — AMOXICILLIN AND CLAVULANATE POTASSIUM 875; 125 MG/1; MG/1
1 TABLET, FILM COATED ORAL 2 TIMES DAILY
Qty: 20 TABLET | Refills: 0 | Status: SHIPPED | OUTPATIENT
Start: 2017-03-31 | End: 2017-04-10

## 2017-03-31 RX ADMIN — CLOPIDOGREL BISULFATE 75 MG: 75 TABLET ORAL at 09:03

## 2017-03-31 RX ADMIN — IPRATROPIUM BROMIDE AND ALBUTEROL SULFATE 3 ML: .5; 3 SOLUTION RESPIRATORY (INHALATION) at 07:03

## 2017-03-31 RX ADMIN — ATENOLOL 50 MG: 50 TABLET ORAL at 09:03

## 2017-03-31 RX ADMIN — TAMSULOSIN HYDROCHLORIDE 0.4 MG: 0.4 CAPSULE ORAL at 09:03

## 2017-03-31 RX ADMIN — PANTOPRAZOLE SODIUM 40 MG: 40 TABLET, DELAYED RELEASE ORAL at 09:03

## 2017-03-31 RX ADMIN — HEPARIN SODIUM 5000 UNITS: 5000 INJECTION, SOLUTION INTRAVENOUS; SUBCUTANEOUS at 05:03

## 2017-03-31 RX ADMIN — POTASSIUM & SODIUM PHOSPHATES POWDER PACK 280-160-250 MG 1 PACKET: 280-160-250 PACK at 06:03

## 2017-03-31 RX ADMIN — RANOLAZINE 500 MG: 500 TABLET, FILM COATED, EXTENDED RELEASE ORAL at 09:03

## 2017-03-31 RX ADMIN — CEFTRIAXONE 1 G: 1 INJECTION, SOLUTION INTRAVENOUS at 01:03

## 2017-03-31 RX ADMIN — ISOSORBIDE MONONITRATE 120 MG: 60 TABLET, EXTENDED RELEASE ORAL at 09:03

## 2017-03-31 RX ADMIN — NIFEDIPINE 120 MG: 10 CAPSULE ORAL at 09:03

## 2017-03-31 RX ADMIN — ATORVASTATIN CALCIUM 80 MG: 40 TABLET, FILM COATED ORAL at 09:03

## 2017-03-31 RX ADMIN — NEOMYCIN SULFATE, POLYMYXIN B SULFATE AND DEXAMETHASONE 1 DROP: 3.5; 10000; 1 SUSPENSION/ DROPS OPHTHALMIC at 05:03

## 2017-03-31 NOTE — NURSING
Patient's son her to take pt home. Pt taken down in wheelchair to vehicle with all personal belongings.

## 2017-03-31 NOTE — NURSING
Patient discharged per MD order. Discharge instructions and handout given to patient. Patient verbalizes understanding and has no questions at this time. Patient refused to take lasix before going home d/t long car ride home. Per pt he will take when he gets home. IV discontinued, telemetry removed and returned to monitor tech. Patient awaiting transportation.

## 2017-03-31 NOTE — SUBJECTIVE & OBJECTIVE
Interval History:  Overall reports feeling much better.    Review of Systems   Constitutional: Negative.  Negative for chills and fever.   HENT: Negative.  Negative for congestion and sore throat.    Eyes: Positive for discharge and itching. Negative for visual disturbance.   Respiratory: Positive for cough and shortness of breath. Negative for wheezing.    Cardiovascular: Negative.  Negative for chest pain.   Gastrointestinal: Negative for abdominal pain, diarrhea, nausea and vomiting.   Endocrine: Negative.    Genitourinary: Negative.    Musculoskeletal: Negative.  Negative for myalgias and neck stiffness.   Skin: Negative.  Negative for color change and pallor.   Allergic/Immunologic: Negative.    Neurological: Negative.    Hematological: Negative.    Psychiatric/Behavioral: Negative.    All other systems reviewed and are negative.    Objective:     Vital Signs (Most Recent):  Temp: 98.3 °F (36.8 °C) (03/30/17 1946)  Pulse: 100 (03/30/17 2010)  Resp: 18 (03/30/17 2010)  BP: (!) 143/79 (03/30/17 1946)  SpO2: (!) 94 % (03/30/17 2010) Vital Signs (24h Range):  Temp:  [98 °F (36.7 °C)-98.9 °F (37.2 °C)] 98.3 °F (36.8 °C)  Pulse:  [] 100  Resp:  [18-22] 18  SpO2:  [89 %-96 %] 94 %  BP: (127-156)/(62-99) 143/79     Weight: 88 kg (194 lb)  Body mass index is 31.31 kg/(m^2).    Intake/Output Summary (Last 24 hours) at 03/30/17 2028  Last data filed at 03/30/17 1830   Gross per 24 hour   Intake              540 ml   Output             2600 ml   Net            -2060 ml      Physical Exam   Constitutional: He is oriented to person, place, and time. He appears well-developed and well-nourished. No distress.   HENT:   Head: Normocephalic and atraumatic.   Mouth/Throat: Oropharynx is clear and moist.   Eyes: EOM are normal. Pupils are equal, round, and reactive to light.   Conjunctival erythema and injection bilaterally.   Neck: No JVD present. No thyromegaly present.   Cardiovascular: Normal rate, regular rhythm and  normal heart sounds.  Exam reveals no gallop and no friction rub.    No murmur heard.  Pulmonary/Chest: Effort normal and breath sounds normal. No respiratory distress. He has no wheezes. He has no rales.   Abdominal: Soft. Bowel sounds are normal. He exhibits no distension. There is no tenderness. There is no rebound and no guarding.   Musculoskeletal: Normal range of motion. He exhibits no edema or tenderness.   Lymphadenopathy:     He has no cervical adenopathy.   Neurological: He is alert and oriented to person, place, and time. He has normal reflexes. He displays normal reflexes. No cranial nerve deficit.   Skin: Skin is warm and dry. No rash noted. He is not diaphoretic. No erythema.   Psychiatric: He has a normal mood and affect. His behavior is normal. Judgment and thought content normal.       Significant Labs:   CBC:   Recent Labs  Lab 03/29/17  0915 03/30/17  0517   WBC 17.17* 13.69*   HGB 13.1* 13.2*   HCT 40.5 41.0    245     CMP:   Recent Labs  Lab 03/29/17 0915 03/30/17  0517    141   K 4.7 3.7    98   CO2 29 29   * 125*   BUN 25* 27*   CREATININE 1.4 1.5*   CALCIUM 9.4 9.0   PROT 7.4  --    ALBUMIN 3.3*  --    BILITOT 1.3*  --    ALKPHOS 77  --    AST 14  --    ALT 14  --    ANIONGAP 11 14   EGFRNONAA 44* 40*       Significant Imaging: I have reviewed all pertinent imaging results/findings within the past 24 hours.

## 2017-03-31 NOTE — PROGRESS NOTES
Pt converted from ST to AFIB. NP notified pt did not receive scheduled meds on day shift due to NPO for swallow eval, new order for home meds- atenolol and cardizem. Will continue to monitor.

## 2017-03-31 NOTE — PLAN OF CARE
Problem: Patient Care Overview  Goal: Plan of Care Review  Outcome: Ongoing (interventions implemented as appropriate)  Pt alert and oriented, rested during shift. 3-4L NC maintained, 1500 ml noted on IS. Converted to AFIB- new order for home medications. Phosphate packets administered, labs in AM. Pt remained free of falls during shift, refused bed alarm set- educated pt on risk and benefits, urinal at bedside, call light in reach, room free of clutter, side rails up X2, pt on telemetry monitor AFIB 's, will continue to monitor.

## 2017-03-31 NOTE — PLAN OF CARE
03/31/17 1432   Final Note   Assessment Type Final Discharge Note   Discharge Disposition Home

## 2017-03-31 NOTE — PROGRESS NOTES
[facility] Hospital Medicine  Progress Note    Patient Name: Refugio Ferraro  MRN: [id number]  Patient Class: IP- Inpatient   Admission Date: 3/29/2017  Length of Stay: 1 days  Attending Physician: Galindo Mckinnon MD  Primary Care Provider: Blaire Kent MD        Subjective:     Principal Problem:Bilateral pneumonia    HPI:   Refugio Ferraro is a 89 y.o. male patient who presents to the Emergency Department for SOB  which onset gradually yesterday. Symptoms are constant and moderate in severity. Sx are exacerbated with exertion/laying flat and relieved by nothing. Associated sxs include cough and leg swelling. Pt states he had a dry hacking cough for 2 weeks and reports using robitussin with no relief. Pt reports taking mucinex last PM and reports productive cough this AM. Pt states he is on oxygen at night. Pt states he will often use his oxygen during the day if his oxygen sats are in the 90s. Patient denies any fever, N/V/D, chills, CP, chest tightness, palpitations, abd pain, wheezing, lightheadedness, dizziness and all other sxs at this time. No further complaints or concerns at this time.     Hospital Course:  Pt admitted to Telemetry Unit for Bilateral PNA.  D-dimer initially elevated with CTA of chest negative for PE and confirmed bilateral lower lobe pneumonia, much worse in the right lung, with some reactive right hilar lymphadenopathy.  Pt treated with IV antibiotics, supplemental oxygen, and Duonebs prn.   Will continue to follow repeat blood cultures and lactate levels.  Elevated WBC count but afebrile.  SLP evaluation was negative for aspiration and low risk.  Responded to diuresis as well.    Interval History:  Overall reports feeling much better.    Review of Systems   Constitutional: Negative.  Negative for chills and fever.   HENT: Negative.  Negative for congestion and sore throat.    Eyes: Positive for discharge and itching. Negative for visual disturbance.   Respiratory:  Positive for cough and shortness of breath. Negative for wheezing.    Cardiovascular: Negative.  Negative for chest pain.   Gastrointestinal: Negative for abdominal pain, diarrhea, nausea and vomiting.   Endocrine: Negative.    Genitourinary: Negative.    Musculoskeletal: Negative.  Negative for myalgias and neck stiffness.   Skin: Negative.  Negative for color change and pallor.   Allergic/Immunologic: Negative.    Neurological: Negative.    Hematological: Negative.    Psychiatric/Behavioral: Negative.    All other systems reviewed and are negative.    Objective:     Vital Signs (Most Recent):  Temp: 98.3 °F (36.8 °C) (03/30/17 1946)  Pulse: 100 (03/30/17 2010)  Resp: 18 (03/30/17 2010)  BP: (!) 143/79 (03/30/17 1946)  SpO2: (!) 94 % (03/30/17 2010) Vital Signs (24h Range):  Temp:  [98 °F (36.7 °C)-98.9 °F (37.2 °C)] 98.3 °F (36.8 °C)  Pulse:  [] 100  Resp:  [18-22] 18  SpO2:  [89 %-96 %] 94 %  BP: (127-156)/(62-99) 143/79     Weight: 88 kg (194 lb)  Body mass index is 31.31 kg/(m^2).    Intake/Output Summary (Last 24 hours) at 03/30/17 2028  Last data filed at 03/30/17 1830   Gross per 24 hour   Intake              540 ml   Output             2600 ml   Net            -2060 ml      Physical Exam   Constitutional: He is oriented to person, place, and time. He appears well-developed and well-nourished. No distress.   HENT:   Head: Normocephalic and atraumatic.   Mouth/Throat: Oropharynx is clear and moist.   Eyes: EOM are normal. Pupils are equal, round, and reactive to light.   Conjunctival erythema and injection bilaterally.   Neck: No JVD present. No thyromegaly present.   Cardiovascular: Normal rate, regular rhythm and normal heart sounds.  Exam reveals no gallop and no friction rub.    No murmur heard.  Pulmonary/Chest: Effort normal and breath sounds normal. No respiratory distress. He has no wheezes. He has no rales.   Abdominal: Soft. Bowel sounds are normal. He exhibits no distension. There is no  tenderness. There is no rebound and no guarding.   Musculoskeletal: Normal range of motion. He exhibits no edema or tenderness.   Lymphadenopathy:     He has no cervical adenopathy.   Neurological: He is alert and oriented to person, place, and time. He has normal reflexes. He displays normal reflexes. No cranial nerve deficit.   Skin: Skin is warm and dry. No rash noted. He is not diaphoretic. No erythema.   Psychiatric: He has a normal mood and affect. His behavior is normal. Judgment and thought content normal.       Significant Labs:   CBC:   Recent Labs  Lab 03/29/17  0915 03/30/17 0517   WBC 17.17* 13.69*   HGB 13.1* 13.2*   HCT 40.5 41.0    245     CMP:   Recent Labs  Lab 03/29/17 0915 03/30/17 0517    141   K 4.7 3.7    98   CO2 29 29   * 125*   BUN 25* 27*   CREATININE 1.4 1.5*   CALCIUM 9.4 9.0   PROT 7.4  --    ALBUMIN 3.3*  --    BILITOT 1.3*  --    ALKPHOS 77  --    AST 14  --    ALT 14  --    ANIONGAP 11 14   EGFRNONAA 44* 40*       Significant Imaging: I have reviewed all pertinent imaging results/findings within the past 24 hours.    Assessment/Plan:      * Bilateral pneumonia  -Pt admitted to Telemetry Unit   -IVF  -IV antibiotics (Azithromycin and Rocephin)  -supplemental oxygen  -lactate 1.2  -will follow blood culture results    Asthma with COPD  -supplemental oxygen  -Duonebs prn     AF (paroxysmal atrial fibrillation)  -SR on monitor   - Atenolol and Diltiazem continued  -ASA and Plavix continued  -INR 1.3    Essential hypertension  -home medications resumed  -Imdur and Randolazine     Acute on chronic diastolic congestive heart failure  -IV Lasix  -elevated BNP noted  -supplemental oxygen  -strict I/Os  -daily weights  -fluid restriction  -Echo results pending- last Echo showed EF 55% with diastolic dysfunction and PAP 46    Acute on chronic respiratory failure  -improved  -supplemental oxygen at night and as needed at baseline  -Duonebs prn     VTE Risk Mitigation          Ordered     heparin (porcine) injection 5,000 Units  Every 8 hours     Route:  Subcutaneous        03/30/17 1154          Galindo Mckinnon MD  Department of Hospital Medicine   [facility]

## 2017-03-31 NOTE — DISCHARGE SUMMARY
[facility] Hospital Medicine  Discharge Summary      Patient Name: Refugio Ferraro  MRN: [id number]  Admission Date: 3/29/2017  Hospital Length of Stay: 2 days  Discharge Date and Time: No discharge date for patient encounter.  Attending Physician: Galindo Mckinnon MD   Discharging Provider: Galindo Mckinnon MD  Primary Care Provider: Blaire Kent MD      HPI:    Refugio Ferraro is a 89 y.o. male patient who presents to the Emergency Department for SOB  which onset gradually yesterday. Symptoms are constant and moderate in severity. Sx are exacerbated with exertion/laying flat and relieved by nothing. Associated sxs include cough and leg swelling. Pt states he had a dry hacking cough for 2 weeks and reports using robitussin with no relief. Pt reports taking mucinex last PM and reports productive cough this AM. Pt states he is on oxygen at night. Pt states he will often use his oxygen during the day if his oxygen sats are in the 90s. Patient denies any fever, N/V/D, chills, CP, chest tightness, palpitations, abd pain, wheezing, lightheadedness, dizziness and all other sxs at this time. No further complaints or concerns at this time.     * No surgery found *      Indwelling Lines/Drains at time of discharge:   Lines/Drains/Airways          No matching active lines, drains, or airways        Hospital Course:   Pt admitted to Telemetry Unit for Bilateral PNA.  D-dimer initially elevated with CTA of chest negative for PE and confirmed bilateral lower lobe pneumonia, much worse in the right lung, with some reactive right hilar lymphadenopathy.  Pt treated with IV antibiotics, supplemental oxygen, and Duonebs prn.   Will continue to follow repeat blood cultures and lactate levels.  Elevated WBC count but afebrile.  SLP evaluation was negative for aspiration and low risk.  Responded to diuresis as well.  Continued symptomatic improvement and remained afebrile.  WBC count slightly elevated but trending  toward normal.  Oxygen requirements at baseline.  Although SLP evaluation was normal it does not rule out an aspiration event.  Discharged on Augmentin for 10 days and Azithromycin for 5 days with Florastor probiotic and follow up with PCP.  I have seen and examined Mr. Ferraro on the day of discharge and deemed him safe to return home.     Consults:   Consults         Status Ordering Provider     Inpatient consult to Internal Medicine  Once     Provider:  Galindo Mckinnon MD    Acknowledged OREN PASTRANA          Significant Diagnostic Studies: Labs:   CMP   Recent Labs  Lab 03/29/17 0915 03/30/17 0517 03/31/17 0409    141 144   K 4.7 3.7 3.8    98 99   CO2 29 29 30*   * 125* 125*   BUN 25* 27* 27*   CREATININE 1.4 1.5* 1.4   CALCIUM 9.4 9.0 9.1   PROT 7.4  --   --    ALBUMIN 3.3*  --   --    BILITOT 1.3*  --   --    ALKPHOS 77  --   --    AST 14  --   --    ALT 14  --   --    ANIONGAP 11 14 15   ESTGFRAFRICA 51* 47* 51*   EGFRNONAA 44* 40* 44*    and CBC   Recent Labs  Lab 03/29/17 0915 03/30/17 0517 03/31/17 0409   WBC 17.17* 13.69* 12.30   HGB 13.1* 13.2* 13.6*   HCT 40.5 41.0 42.4    245 279       Pending Diagnostic Studies:     Procedure Component Value Units Date/Time    Procalcitonin [[id number]] Collected:  03/31/17 0409    Order Status:  Sent Lab Status:  In process Updated:  03/31/17 0409    Specimen:  Blood         Final Active Diagnoses:    Diagnosis Date Noted POA    PRINCIPAL PROBLEM:  Bilateral pneumonia [J18.9] 03/29/2017 Yes    Acute on chronic diastolic congestive heart failure [I50.33] 03/29/2017 Yes    Leukocytosis [D72.829] 03/29/2017 Yes    Acute on chronic respiratory failure [J96.20] 03/29/2017 Yes    AF (paroxysmal atrial fibrillation) [I48.0] 05/04/2016 Yes     Chronic    Essential hypertension [I10] 05/04/2016 Yes     Chronic    Asthma with COPD [J44.9, J45.909] 10/08/2015 Yes     Chronic      Problems Resolved During this Admission:     Diagnosis Date Noted Date Resolved POA      No new Assessment & Plan notes have been filed under this hospital service since the last note was generated.  Service: Hospital Medicine      Discharged Condition: good    Disposition: Home or Self Care    Follow Up:  Follow-up Information     Follow up with Blaire Kent MD In 1 week.    Specialty:  Family Medicine    Why:  Hospital follow up    Contact information:    [id number] [address] [address]  [phone]          Patient Instructions:     Ambulatory Referral to Pharmacy Assistance   Referral Priority: Routine Referral Type: Consultation   Referral Reason: Specialty Services Required    Number of Visits Requested: 1      Diet Cardiac   Scheduling Instructions: Limit Sodium to 2 g daily       Medications:  Reconciled Home Medications:   Current Discharge Medication List      START taking these medications    Details   amoxicillin-clavulanate 875-125mg (AUGMENTIN) 875-125 mg per tablet Take 1 tablet by mouth 2 (two) times daily.  Qty: 20 tablet, Refills: 0      azithromycin (Z-DEEPAK) 250 MG tablet Take 1 tablet (250 mg total) by mouth once daily.  Qty: 5 tablet, Refills: 0      Saccharomyces boulardii (FLORASTOR) 250 mg capsule Take 1 capsule (250 mg total) by mouth 2 (two) times daily.  Qty: 60 capsule, Refills: 0         CONTINUE these medications which have NOT CHANGED    Details   aspirin (ECOTRIN) 81 MG EC tablet Take 81 mg by mouth every evening.      atenolol (TENORMIN) 25 MG tablet TAKE 2 TABLETS ONE TIME DAILY  Qty: 180 tablet, Refills: 3      atorvastatin (LIPITOR) 80 MG tablet TAKE 1 TABLET ONE TIME DAILY  Qty: 90 tablet, Refills: 4    Associated Diagnoses: Hyperlipidemia      CARTIA  mg Cp24 TAKE 1 CAPSULE EVERY DAY  Qty: 90 capsule, Refills: 3      clopidogrel (PLAVIX) 75 mg tablet TAKE 1 TABLET ONE TIME DAILY  Qty: 90 tablet, Refills: 3      furosemide (LASIX) 40 MG tablet TAKE 1 TABLET EVERY DAY  Qty: 90 tablet, Refills: 3       isosorbide mononitrate (IMDUR) 120 MG 24 hr tablet TAKE 1 TABLET EVERY DAY  Qty: 90 tablet, Refills: 3      MULTIVITAMIN W-MINERALS/LUTEIN (CENTRUM SILVER ORAL) Take by mouth.      pantoprazole (PROTONIX) 40 MG tablet TAKE 1 TABLET ONE TIME DAILY  Qty: 90 tablet, Refills: 4      potassium chloride SA (K-DUR,KLOR-CON) 20 MEQ tablet TAKE 1 TABLET ONE TIME DAILY  Qty: 90 tablet, Refills: 3      ranolazine (RANEXA) 500 MG Tb12 Take 1 tablet (500 mg total) by mouth 2 (two) times daily.  Qty: 180 tablet, Refills: 3    Associated Diagnoses: Coronary artery disease, occlusive; Chronic diastolic heart failure      tamsulosin (FLOMAX) 0.4 mg Cp24 Take 1 capsule (0.4 mg total) by mouth once daily.  Qty: 90 capsule, Refills: 3      albuterol-ipratropium 2.5mg-0.5mg/3mL (DUO-NEB) 0.5 mg-3 mg(2.5 mg base)/3 mL nebulizer solution Take 3 mLs by nebulization every 6 (six) hours.  Qty: 120 vial, Refills: 12    Associated Diagnoses: Asthma with COPD      co-enzyme Q-10 50 mg capsule Take 50 mg by mouth once daily.      GLUCOSAMINE HCL/CHONDR HOLT A NA (OSTEO BI-FLEX ORAL) Take by mouth 2 (two) times daily.      magnesium oxide (MAG-OX) 400 mg tablet Take 400 mg by mouth 2 (two) times daily.            Time spent on the discharge of patient: 30 minutes    Galindo Mckinnon MD  Department of Hospital Medicine  [facility]

## 2017-04-03 ENCOUNTER — PATIENT OUTREACH (OUTPATIENT)
Dept: ADMINISTRATIVE | Facility: CLINIC | Age: 82
End: 2017-04-03
Payer: MEDICARE

## 2017-04-03 LAB
BACTERIA BLD CULT: NORMAL
BACTERIA BLD CULT: NORMAL

## 2017-04-03 NOTE — PATIENT INSTRUCTIONS
When You Have Pneumonia  You have been diagnosed with pneumonia. This is a serious lung infection. Most cases of pneumonia are caused by bacteria. Pneumonia most often occurs in older adults, young children, and people with chronic health problems.  Home care  · Take your medicine exactly as directed. Dont skip doses. Continue taking your antibiotics as until they are all gone, even if you start to feel better. This will prevent the pneumonia from coming back.  · Drink at least 8 glasses of water daily, unless directed otherwise. This helps to loosen and thin secretions so that you can cough them up.  · Use a cool-mist humidifier in your bedroom. Be sure to clean the humidifier daily.  · Dont use medicines to suppress your cough unless your cough is dry, painful, or interferes with your sleep. Coughing up mucus is normal. You may use an expectorant if your healthcare provider says its okay.  · You can use warm compresses or a heating pad on the lowest setting to relieve chest discomfort. Use several times a day for 15-20 minutes at a time. To prevent injury to your skin, set the temperature to warm, not hot. Dont put the compress or pad directly on your skin. Make certain it has a cover or wrap it in a towel. This is to prevent skin burns.  · Get plenty of rest until your fever, shortness of breath, and chest pain go away.  · Plan to get a flu shot every year. The flu is a common cause of pneumonia. Getting a flu shot every year can help prevent both the flu and pneumonia.  Getting the pneumococcal vaccine  Talk with your healthcare provider about getting the pneumococcalvaccine. Pneumococcal pneumonia is caused by bacteria that spread from person to person. It can cause minor problems, such as ear infections. But it can also turn into life-threatening illnesses of the lungs (pneumonia), the covering of the brain and spinal cord (meningitis), and the blood (bacteremia).  Children under 2 years of age, adults  over age 65, people with certain health conditions, and smokers are at the highest risk of pneumococcal disease. This vaccine can help prevent pneumococcal disease in both adults and children. Some people should not have the vaccine. Make sure to ask your healthcare provider if you should have the vaccine.   Follow-up care  Make a follow-up appointment as directed by our staff.  When to call your healthcare provider  Call your healthcare provider if you have any of the following:  · Fever above 100.4°F (38°C), or as directed by your healthcare provider  · Mucus from the lungs (sputum) thats yellow, green, bloody, or smells bad  · A large amount of sputum  · Vomiting  · Symptoms that get worse  When to call 911  Call 911 right away if you have any of the following:  · Chest pain  · Trouble breathing  · Blue lips or fingernails   Date Last Reviewed: 11/1/2016  © 2091-8414 [employer]. [address] [place] [address], [place] [address]. All rights reserved. This information is not intended as a substitute for professional medical care. Always follow your healthcare professional's instructions.

## 2017-04-11 ENCOUNTER — TELEPHONE (OUTPATIENT)
Dept: INTERNAL MEDICINE | Facility: CLINIC | Age: 82
End: 2017-04-11

## 2017-04-11 ENCOUNTER — OFFICE VISIT (OUTPATIENT)
Dept: INTERNAL MEDICINE | Facility: CLINIC | Age: 82
End: 2017-04-11
Payer: MEDICARE

## 2017-04-11 VITALS
WEIGHT: 197.06 LBS | DIASTOLIC BLOOD PRESSURE: 60 MMHG | SYSTOLIC BLOOD PRESSURE: 92 MMHG | HEART RATE: 76 BPM | TEMPERATURE: 97 F | HEIGHT: 66 IN | BODY MASS INDEX: 31.67 KG/M2 | OXYGEN SATURATION: 93 %

## 2017-04-11 DIAGNOSIS — J18.9 PNEUMONIA OF BOTH LOWER LOBES DUE TO INFECTIOUS ORGANISM: ICD-10-CM

## 2017-04-11 DIAGNOSIS — H91.90 HEARING LOSS, UNSPECIFIED HEARING LOSS TYPE, UNSPECIFIED LATERALITY: ICD-10-CM

## 2017-04-11 DIAGNOSIS — Z09 HOSPITAL DISCHARGE FOLLOW-UP: Primary | ICD-10-CM

## 2017-04-11 PROBLEM — I50.33 ACUTE ON CHRONIC DIASTOLIC CONGESTIVE HEART FAILURE: Status: RESOLVED | Noted: 2017-03-29 | Resolved: 2017-04-11

## 2017-04-11 PROCEDURE — 99999 PR PBB SHADOW E&M-EST. PATIENT-LVL IV: CPT | Mod: PBBFAC,,, | Performed by: FAMILY MEDICINE

## 2017-04-11 RX ORDER — NEBULIZER AND COMPRESSOR
1 EACH MISCELLANEOUS 4 TIMES DAILY PRN
Qty: 1 EACH | Refills: 12 | Status: SHIPPED | OUTPATIENT
Start: 2017-04-11

## 2017-04-11 RX ORDER — IPRATROPIUM BROMIDE AND ALBUTEROL SULFATE 2.5; .5 MG/3ML; MG/3ML
3 SOLUTION RESPIRATORY (INHALATION) EVERY 6 HOURS PRN
Qty: 3 ML | Refills: 12 | Status: SHIPPED | OUTPATIENT
Start: 2017-04-11 | End: 2018-04-11

## 2017-04-11 NOTE — TELEPHONE ENCOUNTER
----- Message from Sher Thomas sent at 4/11/2017  1:40 PM CDT -----  Contact: pt  He's calling in regards to orders to be sent for oxygen O02 solutions, please advise, [phone] (home) [phone] (work)

## 2017-04-11 NOTE — MR AVS SNAPSHOT
[facility]  [address] [place] [address]  Phone: [phone]                  Refugio Ferraro   2017 10:40 AM   Office Visit    Description:  Male : 1926   Provider:  Blaire Kent MD   Department:  [facility]           Reason for Visit     Hospital Follow Up           Diagnoses this Visit        Comments    Hospital discharge follow-up    -  Primary     Pneumonia of both lower lobes due to infectious organism         Hearing loss, unspecified hearing loss type, unspecified laterality                To Do List           Future Appointments        Provider Department Dept Phone    5/3/2017 1:20 PM MD SOHAM Vance[facility]Steve - Cardiology [phone]    2017 9:30 AM ECHO, [facility] - Echo/Stress Lab [phone]    2017 10:40 AM RESEARCH, CARDIOLOGY-OP [facility] [phone]    2017 11:00 AM Lorena Moore PA-C [facility]-Interventional Card [phone]    2017 11:30 AM NOM XROP3 485 LB LIMIT [facility] [phone]      Goals (5 Years of Data)     None       These Medications        Disp Refills Start End    nebulizer and compressor Rebeca 1 each 12 2017     1 Units by Misc.(Non-Drug; Combo Route) route 4 (four) times daily as needed. - Misc.(Non-Drug; Combo Route)    Pharmacy: [facility] [address] [place] [address] Ph #: [phone]       albuterol-ipratropium 2.5mg-0.5mg/3mL (DUONEB) 0.5 mg-3 mg(2.5 mg base)/3 mL nebulizer solution 3 mL 12 2017    Take 3 mLs by nebulization every 6 (six) hours as needed for Wheezing. Rescue - Nebulization    Pharmacy: [facility] [address] [place] [address] Ph #: [phone]         [facility] On Call     [facility] On Call Nurse Care Line -  Assistance  Unless otherwise directed by your provider, please contact [facility], our nurse care line that is  available for  assistance.     Registered nurses in the [facility] provide: appointment scheduling, clinical advisement, health education, and other advisory services.  Call: [phone] (toll free)               Medications           Message regarding Medications     Verify the changes and/or additions to your medication regime listed below are the same as discussed with your clinician today.  If any of these changes or additions are incorrect, please notify your healthcare provider.        START taking these NEW medications        Refills    nebulizer and compressor Rebeca 12    Si Units by Misc.(Non-Drug; Combo Route) route 4 (four) times daily as needed.    Class: Normal    Route: Misc.(Non-Drug; Combo Route)    albuterol-ipratropium 2.5mg-0.5mg/3mL (DUONEB) 0.5 mg-3 mg(2.5 mg base)/3 mL nebulizer solution 12    Sig: Take 3 mLs by nebulization every 6 (six) hours as needed for Wheezing. Rescue    Class: Normal    Route: Nebulization           Verify that the below list of medications is an accurate representation of the medications you are currently taking.  If none reported, the list may be blank. If incorrect, please contact your healthcare provider. Carry this list with you in case of emergency.           Current Medications     aspirin (ECOTRIN) 81 MG EC tablet Take 81 mg by mouth every evening.    atenolol (TENORMIN) 25 MG tablet TAKE 2 TABLETS ONE TIME DAILY    atorvastatin (LIPITOR) 80 MG tablet TAKE 1 TABLET ONE TIME DAILY    CARTIA  mg Cp24 TAKE 1 CAPSULE EVERY DAY    clopidogrel (PLAVIX) 75 mg tablet TAKE 1 TABLET ONE TIME DAILY    co-enzyme Q-10 50 mg capsule Take 50 mg by mouth once daily.    furosemide (LASIX) 40 MG tablet TAKE 1 TABLET EVERY DAY    GLUCOSAMINE HCL/CHONDR HOLT A NA (OSTEO BI-FLEX ORAL) Take by mouth 2 (two) times daily.    isosorbide mononitrate (IMDUR) 120 MG 24 hr tablet TAKE 1 TABLET EVERY DAY    magnesium oxide (MAG-OX) 400 mg tablet Take 400 mg by mouth  "2 (two) times daily.     MULTIVITAMIN W-MINERALS/LUTEIN (CENTRUM SILVER ORAL) Take by mouth.    pantoprazole (PROTONIX) 40 MG tablet TAKE 1 TABLET ONE TIME DAILY    potassium chloride SA (K-DUR,KLOR-CON) 20 MEQ tablet TAKE 1 TABLET ONE TIME DAILY    ranolazine (RANEXA) 500 MG Tb12 Take 1 tablet (500 mg total) by mouth 2 (two) times daily.    Saccharomyces boulardii (FLORASTOR) 250 mg capsule Take 1 capsule (250 mg total) by mouth 2 (two) times daily.    tamsulosin (FLOMAX) 0.4 mg Cp24 Take 1 capsule (0.4 mg total) by mouth once daily.    albuterol-ipratropium 2.5mg-0.5mg/3mL (DUONEB) 0.5 mg-3 mg(2.5 mg base)/3 mL nebulizer solution Take 3 mLs by nebulization every 6 (six) hours as needed for Wheezing. Rescue    nebulizer and compressor Rebeca 1 Units by Misc.(Non-Drug; Combo Route) route 4 (four) times daily as needed.           Clinical Reference Information           Your Vitals Were     BP Pulse Temp Height    92/60 (BP Location: Right arm, Patient Position: Sitting, BP Method: Manual) 76 97.4 °F (36.3 °C) (Tympanic) 5' 6" (1.676 m)    Weight SpO2 BMI    89.4 kg (197 lb 1.5 oz) 93% 31.81 kg/m2      Blood Pressure          Most Recent Value    BP  92/60      Allergies as of 4/11/2017     No Known Allergies      Immunizations Administered on Date of Encounter - 4/11/2017     None      Orders Placed During Today's Visit      Normal Orders This Visit    Ambulatory referral to Audiology       [facility] Sign-Up     Activating your [facility] account is as easy as 1-2-3!     1) Visit [email or web address], select Sign Up Now, enter this activation code and your date of birth, then select Next.  [email or web address]  Expires: 5/15/2017  7:58 AM      2) Create a username and password to use when you visit [facility] in the future and select a security question in case you lose your password and select Next.    3) Enter your e-mail address and click Sign Up!    Additional Information  If you have questions, please e-mail " [email or web address] or call [phone] to talk to our [facility] staff. Remember, [facility] is NOT to be used for urgent needs. For medical emergencies, dial 911.         Language Assistance Services     ATTENTION: Language assistance services are available, free of charge. Please call [phone].      ATENCIÓN: Si habla español, tiene a allen disposición servicios gratuitos de asistencia lingüística. Llame al [phone].     CHÚ Ý: N?u b?n nói Ti?ng Vi?t, có các d?ch v? h? tr? ngôn ng? mi?n phí dành cho b?n. G?i s? [phone].         [facility] complies with applicable Federal civil rights laws and does not discriminate on the basis of race, color, national origin, age, disability, or sex.

## 2017-04-11 NOTE — TELEPHONE ENCOUNTER
----- Message from Rito Francisco sent at 4/11/2017  1:01 PM CDT -----  Contact: stephane Broussard   States that the pharm doesn't fill for the nebulizer , can't get them and cane be reached  at [phone]/ needs to be sent to another pharm. No need for a call back/thanks/dbw

## 2017-04-11 NOTE — PROGRESS NOTES
Transitional Care Note  Subjective:       Patient ID: Refugio Ferraro is a 89 y.o. male.  Chief Complaint: Hospital Follow Up    Family and/or Caretaker present at visit?  Yes.  Diagnostic tests reviewed/disposition: No diagnosic tests pending after this hospitalization.  Disease/illness education: y  Home health/community services discussion/referrals: Patient does not have home health established from hospital visit.  They do need home health.  If needed, we will set up home health for the patient.   Establishment or re-establishment of referral orders for community resources: No other necessary community resources.   Discussion with other health care providers: No discussion with other health care providers necessary.   HPI Comments: Patient is here status post discharge from [facility].  He was diagnosed with bilateral pneumonia treated with IV antibiotics and discharged with Augmentin and Z-Efraín.  He still has some chronic shortness of breath he has home oxygen that he uses it gets short of breath when he goes out would like to get a portable oxygen does not have any home meds at this time.  His son is present with him.  He is to have a nebulizer and used to do a DuoNeb treatment but his nebulizer is somewhere in the storage.  The pneumonia was seen on CAT scan.  He has underlying diastolic dysfunction recent echo was unchanged from before.    Review of Systems   Constitutional: Negative for activity change, chills, fatigue, fever and unexpected weight change.   HENT: Positive for hearing loss. Negative for congestion, ear discharge, ear pain, postnasal drip and rhinorrhea.    Eyes: Negative for pain and visual disturbance.   Respiratory: Positive for shortness of breath (Chronic). Negative for cough and chest tightness.    Cardiovascular: Negative for chest pain and palpitations.   Gastrointestinal: Negative for abdominal pain, diarrhea and vomiting.   Endocrine: Negative for heat intolerance.    Genitourinary: Negative for dysuria, flank pain, frequency and hematuria.   Musculoskeletal: Negative for back pain, gait problem and neck pain.   Skin: Negative for color change and rash.   Neurological: Negative for dizziness, tremors, seizures, numbness and headaches.   Psychiatric/Behavioral: Negative for agitation, hallucinations, self-injury, sleep disturbance and suicidal ideas. The patient is not nervous/anxious.        Objective:      Physical Exam   Constitutional: He is oriented to person, place, and time. He appears well-developed.   HENT:   Mouth/Throat: Oropharynx is clear and moist.   Eyes: Conjunctivae are normal. Pupils are equal, round, and reactive to light.   Neck: Normal range of motion. Neck supple.   Cardiovascular: Normal rate, regular rhythm and normal heart sounds.    No murmur heard.  Pulmonary/Chest: Effort normal and breath sounds normal. No respiratory distress. He has no wheezes. He has no rales. He exhibits no tenderness.   Abdominal: Soft. He exhibits no distension and no mass. There is no tenderness. There is no guarding.   Musculoskeletal: He exhibits no edema or tenderness.   Lymphadenopathy:     He has no cervical adenopathy.   Neurological: He is alert and oriented to person, place, and time. He has normal reflexes.   Skin: Skin is warm and dry.   Psychiatric: He has a normal mood and affect. His behavior is normal. Judgment and thought content normal.       Assessment:       1. Hospital discharge follow-up    2. Pneumonia of both lower lobes due to infectious organism    3. Hearing loss, unspecified hearing loss type, unspecified laterality        Plan:     patient is improved a prescription for a nebulizer and DuoNeb will be sent to his pharmacy.  We'll work on getting her portable oxygen for him and will also get home health to check on him.  He has significant hearing impairment, will need a hearing evaluation with cardiology.  Diastolic dysfunction stable patient's BNP  was stable  Other medical problems are stable continue current meds and plan.  I reviewed the discharge summary from the hospital.  His son is present with him today.

## 2017-04-13 ENCOUNTER — TELEPHONE (OUTPATIENT)
Dept: PHARMACY | Facility: CLINIC | Age: 82
End: 2017-04-13

## 2017-04-13 ENCOUNTER — TELEPHONE (OUTPATIENT)
Dept: INTERNAL MEDICINE | Facility: CLINIC | Age: 82
End: 2017-04-13

## 2017-04-13 DIAGNOSIS — I10 ESSENTIAL HYPERTENSION: ICD-10-CM

## 2017-04-13 DIAGNOSIS — J18.9 PNEUMONIA DUE TO INFECTIOUS ORGANISM, UNSPECIFIED LATERALITY, UNSPECIFIED PART OF LUNG: Primary | ICD-10-CM

## 2017-04-13 DIAGNOSIS — N18.9 CKD (CHRONIC KIDNEY DISEASE), UNSPECIFIED STAGE: ICD-10-CM

## 2017-04-13 DIAGNOSIS — I71.40 ABDOMINAL AORTIC ANEURYSM (AAA) WITHOUT RUPTURE: ICD-10-CM

## 2017-04-13 DIAGNOSIS — I25.10 CORONARY ARTERY DISEASE, ANGINA PRESENCE UNSPECIFIED, UNSPECIFIED VESSEL OR LESION TYPE, UNSPECIFIED WHETHER NATIVE OR TRANSPLANTED HEART: ICD-10-CM

## 2017-04-13 NOTE — TELEPHONE ENCOUNTER
Spoke with patient about referral for Ranexa.  Patient stated he pays about 141.00 for a 3 month supply mail order.  I explained to the patient that is normal copay for a 3 month supply of medications.  It is costing him around 47.00 a month.  Explained that the drug company can assist once the medications get into the hundreds per month.  Patient stated he has a doctors appointment in a couple of weeks to discuss his plans with the doctor.

## 2017-04-15 ENCOUNTER — HOSPITAL ENCOUNTER (EMERGENCY)
Facility: HOSPITAL | Age: 82
Discharge: HOME | End: 2017-04-15
Attending: EMERGENCY MEDICINE
Payer: MEDICARE

## 2017-04-15 ENCOUNTER — NURSE TRIAGE (OUTPATIENT)
Dept: ADMINISTRATIVE | Facility: CLINIC | Age: 82
End: 2017-04-15

## 2017-04-15 VITALS
TEMPERATURE: 98 F | SYSTOLIC BLOOD PRESSURE: 122 MMHG | DIASTOLIC BLOOD PRESSURE: 55 MMHG | HEIGHT: 66 IN | WEIGHT: 195 LBS | OXYGEN SATURATION: 95 % | HEART RATE: 89 BPM | BODY MASS INDEX: 31.34 KG/M2 | RESPIRATION RATE: 20 BRPM

## 2017-04-15 DIAGNOSIS — S70.01XA CONTUSION, HIP AND THIGH, RIGHT, INITIAL ENCOUNTER: ICD-10-CM

## 2017-04-15 DIAGNOSIS — S22.31XA CLOSED FRACTURE OF RIB OF RIGHT SIDE, INITIAL ENCOUNTER: ICD-10-CM
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
## 2017-04-15 DIAGNOSIS — R10.2 PELVIC PAIN IN MALE: ICD-10-CM

## 2017-04-15 DIAGNOSIS — R09.02 HYPOXIA: ICD-10-CM

## 2017-04-15 DIAGNOSIS — S70.11XA CONTUSION, HIP AND THIGH, RIGHT, INITIAL ENCOUNTER: ICD-10-CM

## 2017-04-15 DIAGNOSIS — R52 PAIN: ICD-10-CM
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
## 2017-04-15 LAB
ALBUMIN SERPL BCP-MCNC: 3.3 G/DL
ALP SERPL-CCNC: 71 U/L
ALT SERPL W/O P-5'-P-CCNC: 15 U/L
ANION GAP SERPL CALC-SCNC: 10 MMOL/L
AST SERPL-CCNC: 14 U/L
BASOPHILS # BLD AUTO: 0.03 K/UL
BASOPHILS NFR BLD: 0.3 %
BILIRUB SERPL-MCNC: 1.5 MG/DL
BNP SERPL-MCNC: 550 PG/ML
BUN SERPL-MCNC: 19 MG/DL
CALCIUM SERPL-MCNC: 8.8 MG/DL
CHLORIDE SERPL-SCNC: 105 MMOL/L
CO2 SERPL-SCNC: 26 MMOL/L
CREAT SERPL-MCNC: 1 MG/DL
DIFFERENTIAL METHOD: ABNORMAL
EOSINOPHIL # BLD AUTO: 0.1 K/UL
EOSINOPHIL NFR BLD: 1 %
ERYTHROCYTE [DISTWIDTH] IN BLOOD BY AUTOMATED COUNT: 16.9 %
EST. GFR  (AFRICAN AMERICAN): >60 ML/MIN/1.73 M^2
EST. GFR  (NON AFRICAN AMERICAN): >60 ML/MIN/1.73 M^2
GLUCOSE SERPL-MCNC: 103 MG/DL
HCT VFR BLD AUTO: 40.8 %
HGB BLD-MCNC: 13.4 G/DL
INR PPP: 1.3
LYMPHOCYTES # BLD AUTO: 0.8 K/UL
LYMPHOCYTES NFR BLD: 9.4 %
MCH RBC QN AUTO: 27.5 PG
MCHC RBC AUTO-ENTMCNC: 32.8 %
MCV RBC AUTO: 84 FL
MONOCYTES # BLD AUTO: 1 K/UL
MONOCYTES NFR BLD: 10.9 %
NEUTROPHILS # BLD AUTO: 7 K/UL
NEUTROPHILS NFR BLD: 78.4 %
PLATELET # BLD AUTO: 222 K/UL
PMV BLD AUTO: 8.7 FL
POTASSIUM SERPL-SCNC: 4 MMOL/L
PROT SERPL-MCNC: 6.8 G/DL
PROTHROMBIN TIME: 13.6 SEC
RBC # BLD AUTO: 4.88 M/UL
SODIUM SERPL-SCNC: 141 MMOL/L
TROPONIN I SERPL DL<=0.01 NG/ML-MCNC: 0.01 NG/ML
TROPONIN I SERPL DL<=0.01 NG/ML-MCNC: 0.01 NG/ML
WBC # BLD AUTO: 8.97 K/UL

## 2017-04-15 PROCEDURE — 93005 ELECTROCARDIOGRAM TRACING: CPT

## 2017-04-15 PROCEDURE — 99285 EMERGENCY DEPT VISIT HI MDM: CPT | Mod: 25

## 2017-04-15 PROCEDURE — 96374 THER/PROPH/DIAG INJ IV PUSH: CPT

## 2017-04-15 PROCEDURE — 83880 ASSAY OF NATRIURETIC PEPTIDE: CPT

## 2017-04-15 PROCEDURE — 85025 COMPLETE CBC W/AUTO DIFF WBC: CPT

## 2017-04-15 PROCEDURE — 84484 ASSAY OF TROPONIN QUANT: CPT

## 2017-04-15 PROCEDURE — 63600175 PHARM REV CODE 636 W HCPCS: Performed by: EMERGENCY MEDICINE

## 2017-04-15 PROCEDURE — 80053 COMPREHEN METABOLIC PANEL: CPT

## 2017-04-15 PROCEDURE — 85610 PROTHROMBIN TIME: CPT

## 2017-04-15 PROCEDURE — 93010 ELECTROCARDIOGRAM REPORT: CPT | Mod: ,,, | Performed by: INTERNAL MEDICINE

## 2017-04-15 PROCEDURE — 96375 TX/PRO/DX INJ NEW DRUG ADDON: CPT

## 2017-04-15 RX ORDER — HYDROCODONE BITARTRATE AND ACETAMINOPHEN 5; 325 MG/1; MG/1
1 TABLET ORAL EVERY 4 HOURS PRN
Qty: 18 TABLET | Refills: 0 | Status: SHIPPED | OUTPATIENT
Start: 2017-04-15 | End: 2017-04-20 | Stop reason: SDUPTHER

## 2017-04-15 RX ORDER — FENTANYL CITRATE 50 UG/ML
25 INJECTION, SOLUTION INTRAMUSCULAR; INTRAVENOUS
Status: COMPLETED | OUTPATIENT
Start: 2017-04-15 | End: 2017-04-15

## 2017-04-15 RX ORDER — FUROSEMIDE 10 MG/ML
40 INJECTION INTRAMUSCULAR; INTRAVENOUS
Status: COMPLETED | OUTPATIENT
Start: 2017-04-15 | End: 2017-04-15

## 2017-04-15 RX ADMIN — FUROSEMIDE 40 MG: 10 INJECTION, SOLUTION INTRAMUSCULAR; INTRAVENOUS at 11:04

## 2017-04-15 RX ADMIN — FENTANYL CITRATE 25 MCG: 50 INJECTION INTRAMUSCULAR; INTRAVENOUS at 11:04

## 2017-04-15 NOTE — ED AVS SNAPSHOT
[facility]  [address] [address] [address]               Refugio Ferraro   4/15/2017 10:52 AM   ED    Description:  Male : 1926   Department:  [facility]            Your Care was Coordinated By:     Provider Role From To    Iraj Goncalves MD Attending Provider 04/15/17 1056 --      Reason for Visit     Fall           Diagnoses this Visit        Comments    Fall (on) (from) other stairs and steps, initial encounter    -  Primary     Pain         Fall         Contusion, hip and thigh, right, initial encounter         Pelvic pain in male         Closed fracture of rib of right side, initial encounter         Hypoxia           ED Disposition     ED Disposition Condition Comment    Discharge             To Do List            These Medications        Disp Refills Start End    hydrocodone-acetaminophen 5-325mg (NORCO) 5-325 mg per tablet 18 tablet 0 4/15/2017     Take 1 tablet by mouth every 4 (four) hours as needed for Pain. - Oral    Pharmacy: [facility] [address] [place] [address] #: [phone]         [facility]     [facility] Care [facility] -  Assistance  Unless otherwise directed by your provider, please contact [facility], our nurse care line that is available for  assistance.     Registered nurses in the [facility] provide: appointment scheduling, clinical advisement, health education, and other advisory services.  Call: [phone] (toll free)               Medications           Message regarding Medications     Verify the changes and/or additions to your medication regime listed below are the same as discussed with your clinician today.  If any of these changes or additions are incorrect, please notify your healthcare provider.        START taking these NEW medications        Refills    hydrocodone-acetaminophen 5-325mg (NORCO) 5-325 mg per tablet 0    Sig:  Take 1 tablet by mouth every 4 (four) hours as needed for Pain.    Class: Print    Route: Oral      These medications were administered today        Dose Freq    furosemide injection 40 mg 40 mg ED 1 Time    Sig: Inject 4 mLs (40 mg total) into the vein ED 1 Time.    Class: Normal    Route: Intravenous    fentaNYL 50 mcg/mL injection 25 mcg 25 mcg ED 1 Time    Sig: Inject 0.5 mLs (25 mcg total) into the vein ED 1 Time.    Class: Normal    Route: Intravenous           Verify that the below list of medications is an accurate representation of the medications you are currently taking.  If none reported, the list may be blank. If incorrect, please contact your healthcare provider. Carry this list with you in case of emergency.           Current Medications     albuterol-ipratropium 2.5mg-0.5mg/3mL (DUONEB) 0.5 mg-3 mg(2.5 mg base)/3 mL nebulizer solution Take 3 mLs by nebulization every 6 (six) hours as needed for Wheezing. Rescue    aspirin (ECOTRIN) 81 MG EC tablet Take 81 mg by mouth every evening.    atenolol (TENORMIN) 25 MG tablet TAKE 2 TABLETS ONE TIME DAILY    atorvastatin (LIPITOR) 80 MG tablet TAKE 1 TABLET ONE TIME DAILY    CARTIA  mg Cp24 TAKE 1 CAPSULE EVERY DAY    clopidogrel (PLAVIX) 75 mg tablet TAKE 1 TABLET ONE TIME DAILY    co-enzyme Q-10 50 mg capsule Take 50 mg by mouth once daily.    furosemide (LASIX) 40 MG tablet TAKE 1 TABLET EVERY DAY    GLUCOSAMINE HCL/CHONDR HOLT A NA (OSTEO BI-FLEX ORAL) Take by mouth 2 (two) times daily.    isosorbide mononitrate (IMDUR) 120 MG 24 hr tablet TAKE 1 TABLET EVERY DAY    magnesium oxide (MAG-OX) 400 mg tablet Take 400 mg by mouth 2 (two) times daily.     MULTIVITAMIN W-MINERALS/LUTEIN (CENTRUM SILVER ORAL) Take by mouth.    nebulizer and compressor Rebeca 1 Units by Misc.(Non-Drug; Combo Route) route 4 (four) times daily as needed.    pantoprazole (PROTONIX) 40 MG tablet TAKE 1 TABLET ONE TIME DAILY    potassium chloride SA (K-DUR,KLOR-CON) 20 MEQ tablet  "TAKE 1 TABLET ONE TIME DAILY    ranolazine (RANEXA) 500 MG Tb12 Take 1 tablet (500 mg total) by mouth 2 (two) times daily.    Saccharomyces boulardii (FLORASTOR) 250 mg capsule Take 1 capsule (250 mg total) by mouth 2 (two) times daily.    tamsulosin (FLOMAX) 0.4 mg Cp24 Take 1 capsule (0.4 mg total) by mouth once daily.    furosemide injection 40 mg Inject 4 mLs (40 mg total) into the vein ED 1 Time.    hydrocodone-acetaminophen 5-325mg (NORCO) 5-325 mg per tablet Take 1 tablet by mouth every 4 (four) hours as needed for Pain.           Clinical Reference Information           Your Vitals Were     BP Pulse Temp Resp Height Weight    114/60 72 98 °F (36.7 °C) (Oral) 20 5' 6" (1.676 m) 88.5 kg (195 lb)    SpO2 BMI             91% 31.47 kg/m2         Allergies as of 4/15/2017     No Known Allergies      Immunizations Administered on Date of Encounter - 4/15/2017     None      ED Micro, Lab, POCT     Start Ordered       Status Ordering Provider    04/15/17 1430 04/15/17 1202  Troponin I  Once      Acknowledged     04/15/17 1117 04/15/17 1118  CBC auto differential  STAT      Final result     04/15/17 1117 04/15/17 1118  Comprehensive metabolic panel  STAT      Final result     04/15/17 1117 04/15/17 1118  Protime-INR  STAT      Final result     04/15/17 1117 04/15/17 1118  Troponin I  Now then every 3 hours     Comments:  PLEASE REVIEW ORDER START TIME BEFORE MARKING SPECIMEN COLLECTED.   Start Status   04/15/17 1117 Final result   04/15/17 1417 Final result       Acknowledged     04/15/17 1117 04/15/17 1118  B-Type natriuretic peptide (BNP)  STAT      Final result       ED Imaging Orders     Start Ordered       Status Ordering Provider    04/15/17 1121 04/15/17 1105    1 time imaging,   Status:  Canceled      Canceled     04/15/17 1118 04/15/17 1118  X-Ray Ribs 2 View Right  1 time imaging      Final result     04/15/17 1118 04/15/17 1118  X-Ray Hip 2 View Right  1 time imaging      Final result     04/15/17 1117 " 04/15/17 1118  X-Ray Chest AP Portable  1 time imaging      Final result     04/15/17 1105 04/15/17 1105  CT Head Without Contrast  1 time imaging      Final result     04/15/17 1105 04/15/17 1105  CT Cervical Spine Without Contrast  1 time imaging      Final result     04/15/17 1105 04/15/17 1105    1 time imaging,   Status:  Canceled      Canceled       Discharge References/Attachments     FALLS, PREVENTING, ARE YOU AT RISK OF FALLING? (ENGLISH)    FALLS, PREVENTING, HOW TO PREPARE AND WHAT TO DO (ENGLISH)    RIB FRACTURE (ENGLISH)    INCENTIVE SPIROMETER, DISCHARGE INSTRUCTIONS (ENGLISH)    INCENTIVE SPIROMETER, USING AN (ENGLISH)    HIP CONTUSION (ENGLISH)      Your Scheduled Appointments     May 03, 2017  1:20 PM CDT   Established Patient Visit with MD Carmen Vance - Cardiology ([facility])    [address] [place] [id number]   [phone]            May 16, 2017  9:30 AM CDT   Color Flow Doppler Echo with ECHO, [facility]   Josemanuel Villarreal - Echo/Stress Lab ([facility] )    [address]  [place] [address] [phone]            May 16, 2017 11:00 AM CDT   Established Patient Visit with RACIEL Mehta-Interventional Card ([facility] )    [address]  [place] [address] [phone]            May 16, 2017 11:30 AM CDT   Diagnostic Xray with [facility] [id number] 485 LB LIMIT   [facility] ([facility] )    [address] [place] [address]   [phone]            May 16, 2017 11:50 AM CDT   Non-Fasting Lab with LAB, APPOINTMENT [place] [facility] ([facility])    [address] [facility] [address]   [phone]              [email or web address] Sign-Up     Activating your [facility] account is as easy as 1-2-3!     1) Visit [email or web address], select Sign Up Now, enter this activation code and your date of birth, then select  Next.  [email or web address]  Expires: 5/15/2017  7:58 AM      2) Create a username and password to use when you visit [facility] in the future and select a security question in case you lose your password and select Next.    3) Enter your e-mail address and click Sign Up!    Additional Information  If you have questions, please e-mail [email or web address] or call [phone] to talk to our [facility] staff. Remember, [facility] is NOT to be used for urgent needs. For medical emergencies, dial 911.         Smoking Cessation     If you would like to quit smoking:   You may be eligible for free services if you are a [place] resident and started smoking cigarettes before September 1, 1988.  Call the Smoking Cessation Trust (SCT) toll free at (007) 151-7297 or (708) 363-5382.   Call 3-800-QUIT-NOW if you do not meet the above criteria.   Contact us via email: [email or web address]   View our website for more information: [email or web address]         [facility] complies with applicable Federal civil rights laws and does not discriminate on the basis of race, color, national origin, age, disability, or sex.        Language Assistance Services     ATTENTION: Language assistance services are available, free of charge. Please call [phone].      ATENCIÓN: Si habla español, tiene a allen disposición servicios gratuitos de asistencia lingüística. Llame al [phone].     CHÚ Ý: N?u b?n nói Ti?ng Vi?t, có các d?ch v? h? tr? ngôn ng? mi?n phí dành cho b?n. G?i s? [phone].

## 2017-04-15 NOTE — ED PROVIDER NOTES
"SCRIBE #1 NOTE: I, Chey Sascha/Dain Lopez, am scribing for, and in the presence of, Iraj Goncalves MD. I have scribed the entire note.      History      Chief Complaint   Patient presents with    Fall     c/o pain to ribs on right side, knot to right hip, and hit head but not hard; reports he is on blood thinner       Review of patient's allergies indicates:  No Known Allergies     HPI   HPI    4/15/2017, 11:05 AM   History obtained from the patient and son      History of Present Illness: Refugio Ferraro is a 89 y.o. male patient who presents to the Emergency Department for right sided groin pain which onset suddenly after missing a step, falling, and landing on his right side this morning. Symptoms are constant and moderate in severity. Pt describes the pain as a "pressure" sensation when walking. Pain is increased with movement. No other mitigating or exacerbating factors reported. Pt has hx of CHF, bilateral pneumonia, and cracked ribs. Pt states that he did not take his Lasix yesterday. Associated sxs include CP, swelling to the right hip, SOB, and pain to the right side of the chest wall. Patient denies any LOC, HA, numbness, weakness, dizziness, back pain, neck pain, knee pain, and all other sxs at this time. No further complaints or concerns at this time.       Arrival mode: Personal vehicle    PCP: Blaire Kent MD       Past Medical History:  Past Medical History:   Diagnosis Date    *Atrial fibrillation     Acute decompensated heart failure 4/8/2014    Acute on chronic respiratory failure 3/29/2017    Anticoagulant long-term use     aortic stenosis     degenerative- trileaflets    Aortic stenosis, severe 4/9/2014    Aortic valve disorders 5/13/2014    Arthritis     Asthma     Atrial fibrillation with RVR 1/30/2014    Atrial fibrillation, permanent 8/7/2013    Bilateral pneumonia 3/29/2017    Cancer 1970s, 2006, 2009    skin-removed from nose    Cardiomyopathy     Carotid " atherosclerosis 6/27/2016    CHF (congestive heart failure)     CKD (chronic kidney disease) stage 3, GFR 30-59 ml/min     Coronary artery disease     Coronary artery disease involving native coronary artery of native heart without angina pectoris 10/29/2014    Per patient s/p stents x2     DDD (degenerative disc disease), cervical     Heart murmur     History of coronary angioplasty 10/3/2014    Hypertension     Hypotension, postural 8/7/2013    Pleural effusion     x3 total Thoracentesis 3/11/14 and 2/14/14     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    Severe aortic stenosis 4/9/2014    Syncope and collapse 2/3/2014    Tobacco dependence     resolved       Past Surgical History:  Past Surgical History:   Procedure Laterality Date    CARDIAC CATHETERIZATION  4/11/14    s/p ostial LM HORACIO, mid-RCA HORACIO    CARDIAC VALVE SURGERY  05/2014    aortic     CATARACT EXTRACTION Bilateral     EYE SURGERY Bilateral     laser    ruptured tendon      right arm    SKIN BIOPSY      SKIN CANCER EXCISION      per patient nose x3    THORACENTESIS      x3         Family History:  Family History   Problem Relation Age of Onset    Heart disease Father     Heart attack Brother     Heart disease Brother     Heart attack Brother     Stroke Brother     Heart disease Brother     Hypertension Brother     Heart attack Sister     Heart disease Sister     Emphysema Sister     Pulmonary embolism Mother     No Known Problems Brother     Anemia Neg Hx     Arrhythmia Neg Hx     Asthma Neg Hx     Clotting disorder Neg Hx     Fainting Neg Hx     Heart failure Neg Hx     Hyperlipidemia Neg Hx     Atrial Septal Defect Neg Hx     Cancer Neg Hx     Diabetes Neg Hx     Kidney disease Neg Hx        Social History:  Social History     Social History Main Topics    Smoking status: Former Smoker     Packs/day: 1.00     Years: 25.00     Quit date: 8/7/1968    Smokeless tobacco: Never Used     Alcohol use No    Drug use: No    Sexual activity: No       ROS   Review of Systems   Constitutional: Negative for fever.   HENT: Negative for sore throat.    Respiratory: Positive for shortness of breath.    Cardiovascular: Positive for chest pain.   Gastrointestinal: Negative for nausea.   Genitourinary: Negative for dysuria.   Musculoskeletal: Negative for back pain and neck pain. Arthralgias: right chest wall. Myalgias: right sided groin.   Skin: Negative for rash.        (+)  Swelling to right hip   Neurological: Negative for dizziness, syncope, weakness, numbness and headaches.   Hematological: Does not bruise/bleed easily.   All other systems reviewed and are negative.      Physical Exam    Initial Vitals   BP Pulse Resp Temp SpO2   04/15/17 1050 04/15/17 1050 04/15/17 1050 04/15/17 1050 04/15/17 1050   125/66 79 19 98 °F (36.7 °C) 89 %      Physical Exam  Nursing Notes and Vital Signs Reviewed.  Constitutional: Patient is in no acute distress. Awake and alert. Well-developed and well-nourished.  Head: Normocephalic.  Eyes: PERRL. EOM intact. Conjunctivae are not pale. No scleral icterus.  ENT: Mucous membranes are moist. Oropharynx is clear and symmetric.    Neck: Supple. Full ROM. No lymphadenopathy.  Cardiovascular: Regular rate. Regular rhythm. No murmurs, rubs, or gallops.   Pulmonary/Chest: Mild respiratory distress. Clear to auscultation bilaterally. No wheezing, rales, or rhonchi.  Shallow respiration.  Abdominal: Soft and non-distended.  There is no tenderness.  No rebound, guarding, or rigidity.   Musculoskeletal: Moves all extremities. No obvious deformities. No edema. No calf tenderness. Pain to right posterior lateral ribs.Tenderrness to palpation to right hip. Pain to area of pubic symphysis with movement. Normal ROM to bilateral lower extremities.   Skin: Warm and dry.  Neurological:  Alert, awake, and appropriate.  Normal speech.  No acute focal neurological deficits are  "appreciated.  Psychiatric: Normal affect. Good eye contact. Appropriate in content.    ED Course    Procedures  ED Vital Signs:  Vitals:    04/15/17 1050 04/15/17 1202   BP: 125/66 114/60   Pulse: 79 72   Resp: 19 20   Temp: 98 °F (36.7 °C)    TempSrc: Oral    SpO2: (!) 89% (!) 91%   Weight: 88.5 kg (195 lb)    Height: 5' 6" (1.676 m)        Abnormal Lab Results:  Labs Reviewed   CBC W/ AUTO DIFFERENTIAL - Abnormal; Notable for the following:        Result Value    Hemoglobin 13.4 (*)     RDW 16.9 (*)     MPV 8.7 (*)     Lymph # 0.8 (*)     Gran% 78.4 (*)     Lymph% 9.4 (*)     All other components within normal limits    Narrative:     PLEASE REVIEW ORDER START TIME BEFORE MARKING SPECIMEN  COLLECTED.   COMPREHENSIVE METABOLIC PANEL - Abnormal; Notable for the following:     Albumin 3.3 (*)     Total Bilirubin 1.5 (*)     All other components within normal limits    Narrative:     PLEASE REVIEW ORDER START TIME BEFORE MARKING SPECIMEN  COLLECTED.   PROTIME-INR - Abnormal; Notable for the following:     Prothrombin Time 13.6 (*)     INR 1.3 (*)     All other components within normal limits    Narrative:     PLEASE REVIEW ORDER START TIME BEFORE MARKING SPECIMEN  COLLECTED.   B-TYPE NATRIURETIC PEPTIDE - Abnormal; Notable for the following:      (*)     All other components within normal limits    Narrative:     PLEASE REVIEW ORDER START TIME BEFORE MARKING SPECIMEN  COLLECTED.   TROPONIN I    Narrative:     PLEASE REVIEW ORDER START TIME BEFORE MARKING SPECIMEN  COLLECTED.   TROPONIN I    Narrative:     PLEASE REVIEW ORDER START TIME BEFORE MARKING SPECIMEN  COLLECTED.   TROPONIN I        All Lab Results:  Results for orders placed or performed during the hospital encounter of 04/15/17   CBC auto differential   Result Value Ref Range    WBC 8.97 3.90 - 12.70 K/uL    RBC 4.88 4.60 - 6.20 M/uL    Hemoglobin 13.4 (L) 14.0 - 18.0 g/dL    Hematocrit 40.8 40.0 - 54.0 %    MCV 84 82 - 98 fL    MCH 27.5 27.0 - 31.0 " pg    MCHC 32.8 32.0 - 36.0 %    RDW 16.9 (H) 11.5 - 14.5 %    Platelets 222 150 - 350 K/uL    MPV 8.7 (L) 9.2 - 12.9 fL    Gran # 7.0 1.8 - 7.7 K/uL    Lymph # 0.8 (L) 1.0 - 4.8 K/uL    Mono # 1.0 0.3 - 1.0 K/uL    Eos # 0.1 0.0 - 0.5 K/uL    Baso # 0.03 0.00 - 0.20 K/uL    Gran% 78.4 (H) 38.0 - 73.0 %    Lymph% 9.4 (L) 18.0 - 48.0 %    Mono% 10.9 4.0 - 15.0 %    Eosinophil% 1.0 0.0 - 8.0 %    Basophil% 0.3 0.0 - 1.9 %    Differential Method Automated    Comprehensive metabolic panel   Result Value Ref Range    Sodium 141 136 - 145 mmol/L    Potassium 4.0 3.5 - 5.1 mmol/L    Chloride 105 95 - 110 mmol/L    CO2 26 23 - 29 mmol/L    Glucose 103 70 - 110 mg/dL    BUN, Bld 19 8 - 23 mg/dL    Creatinine 1.0 0.5 - 1.4 mg/dL    Calcium 8.8 8.7 - 10.5 mg/dL    Total Protein 6.8 6.0 - 8.4 g/dL    Albumin 3.3 (L) 3.5 - 5.2 g/dL    Total Bilirubin 1.5 (H) 0.1 - 1.0 mg/dL    Alkaline Phosphatase 71 55 - 135 U/L    AST 14 10 - 40 U/L    ALT 15 10 - 44 U/L    Anion Gap 10 8 - 16 mmol/L    eGFR if African American >60 >60 mL/min/1.73 m^2    eGFR if non African American >60 >60 mL/min/1.73 m^2   Protime-INR   Result Value Ref Range    Prothrombin Time 13.6 (H) 9.0 - 12.5 sec    INR 1.3 (H) 0.8 - 1.2   Troponin I   Result Value Ref Range    Troponin I 0.014 0.000 - 0.026 ng/mL   Troponin I   Result Value Ref Range    Troponin I 0.014 0.000 - 0.026 ng/mL   B-Type natriuretic peptide (BNP)   Result Value Ref Range     (H) 0 - 99 pg/mL         Imaging Results:  Imaging Results         X-Ray Hip 2 View Right (Final result) Result time:  04/15/17 12:17:26    Final result by Bogdan Silva MD (04/15/17 12:17:26)    Impression:     No acute findings.      Electronically signed by: BOGDAN SILVA MD  Date:     04/15/17  Time:    12:17     Narrative:    Complete two view right hip xray,    History:  Injury, right hip fall.  Initial encounter    Bone density and architecture are normal. No acute findings. The femoral neck is  intact.  Chronic spurring and degenerative change is present.  This is mild.            X-Ray Ribs 2 View Right (Final result) Result time:  04/15/17 12:16:43    Final result by Bogdan Silva MD (04/15/17 12:16:43)    Impression:      There are fractures right 6th and 7th ribs with minimal displacement.  No pneumothorax is demonstrated.        Electronically signed by: BOGDAN SILVA MD  Date:     04/15/17  Time:    12:16     Narrative:    Exam: XR RIBS 2 VIEW RIGHT,    Date:  04/15/17 11:39:00    History: Injury, right ribs.    Comparison:  Comparison is made with previous studies.      Findings: There is fracture of the right 6th and 7th ribs.  The cortex is disrupted with mild displacement.  Some irregularity of the posterior aspect of the right 10th rib there is an additional fracture.  No pneumothorax is demonstrated.            X-Ray Chest AP Portable (Final result) Result time:  04/15/17 12:14:07    Final result by Bogdan Silva MD (04/15/17 12:14:07)    Impression:      No acute cardiopulmonary findings are demonstrated.  There is chronic cardiomegaly.      Electronically signed by: BOGDAN SILVA MD  Date:     04/15/17  Time:    12:14     Narrative:    Exam: XR CHEST AP PORTABLE,    Date:  04/15/17 11:19:30    History: Chest Pain    Comparison:  Comparison is made with the current studies as well as previous chest imaging at 03/29/2017.    Findings: There is mild cardiomegaly.  Postsurgical change of the valve is present.  There is subtle sclerosis of the aorta.  The lungs appear free of any new active infiltrate or effusion the chronic pattern of coarse reticular interstitial markings present.            CT Cervical Spine Without Contrast (Final result) Result time:  04/15/17 12:07:22    Final result by Tim Helms MD (04/15/17 12:07:22)    Impression:     No acute findings. Multiple level degenerative changes.    All CT scans at this facility use dose modulation, iterative reconstruction  and/or weight based dosing when appropriate to reduce radiation dose to as low as reasonably achievable.       Electronically signed by: TIM HELMS MD  Date:     04/15/17  Time:    12:07     Narrative:    History: Trauma, fall, hit back of head, neck pain    There is no evidence of fracture or subluxation. There is multiple level degenerative disc disease and facet joint DJD. Mild spinal stenosis at C6-7.            CT Head Without Contrast (Final result) Result time:  04/15/17 11:45:28    Final result by Tim Helms MD (04/15/17 11:45:28)    Impression:         No acute findings    All CT scans at this facility use dose modulation, iterative reconstruction, and/or weight based dosing when appropriate to reduce radiation dose to as low as reasonably achievable.      Electronically signed by: TIM HELMS MD  Date:     04/15/17  Time:    11:45     Narrative:    Exam: Noncontrast CT head    History:    Head trauma, fell and hit back of head    Findings: No intracranial hemorrhage or acute focal brain parenchymal abnormality is identified. There is generalized atrophy and mild to moderate white matter low density microangiopathy. No significant change from 1/10/16. Calvarium is intact. Visualized paranasal sinuses and mastoid air cells are clear.             The EKG was ordered, reviewed, and independently interpreted by the ED provider.  Interpretation time: 11:23  Rate: 76 BPM  Rhythm: normal sinus rhythm  Interpretation: No acute ST changes. No STEMI.           The Emergency Provider reviewed the vital signs and test results, which are outlined above.    ED Discussion     12:52 PM: Reassessed pt at this time.  Pt is resting comfortably in ED bed and is in NAD at this time. Pt states that his condition has improved and he already knows how to use an incentive spirometer. Discussed with pt and family all pertinent ED information and results. Discussed pt dx and plan of tx. Gave pt and family all f/u  and return to the ED instructions. All questions and concerns were addressed at this time. Pt expresses understanding of information and instructions, and is comfortable with plan to discharge. Pt is stable for discharge.        ED Medication(s):  Medications   furosemide injection 40 mg (40 mg Intravenous Given 4/15/17 1146)   fentaNYL 50 mcg/mL injection 25 mcg (25 mcg Intravenous Given 4/15/17 1146)       New Prescriptions    HYDROCODONE-ACETAMINOPHEN 5-325MG (NORCO) 5-325 MG PER TABLET    Take 1 tablet by mouth every 4 (four) hours as needed for Pain.             Medical Decision Making    Medical Decision Making:   Clinical Tests:   Lab Tests: Ordered and Reviewed  Radiological Study: Ordered and Reviewed  Medical Tests: Ordered and Reviewed           Scribe Attestation:   Scribe #1: I performed the above scribed service and the documentation accurately describes the services I performed. I attest to the accuracy of the note.    Attending:   Physician Attestation Statement for Scribe #1: I, Iraj Goncalves MD, personally performed the services described in this documentation, as scribed by Chey Caicedo/Dain Lopez, in my presence, and it is both accurate and complete.          Clinical Impression       ICD-10-CM ICD-9-CM   1. Fall (on) (from) other stairs and steps, initial encounter W10.8XXA E880.9   2. Pain R52 780.96   3. Fall W19.XXXA E888.9   4. Contusion, hip and thigh, right, initial encounter S70.01XA 924.00    S70.11XA    5. Pelvic pain in male R10.2 789.09   6. Closed fracture of rib of right side, initial encounter S22.31XA 807.01   7. Hypoxia R09.02 799.02       Disposition:   Disposition: Discharged  Condition: Stable         Iraj Goncalves MD  04/15/17 1304

## 2017-04-15 NOTE — TELEPHONE ENCOUNTER
Family called re jody two weeks ago. In ED today. On home 02 concentrator. Requested small travel canister of O2. Currently In ER with two fractured ribs after a fall.  Spoke with MD on call -DME has a lot of paper work. rec to ask [facility] about getting O2 out. Spoke with HH RN at [facility] coming out for intake on Monday. Unsure which O2 company is being used - gave names and numbers of local O2 companies to family as MD on call states that order has already been signed. Call back with questions.    Reason for Disposition   [1] Follow-up call to recent contact AND [2] information only call, no triage required    Protocols used: ST INFORMATION ONLY CALL-A-

## 2017-04-17 ENCOUNTER — TELEPHONE (OUTPATIENT)
Dept: INTERNAL MEDICINE | Facility: CLINIC | Age: 82
End: 2017-04-17

## 2017-04-17 DIAGNOSIS — J18.9 PNEUMONIA DUE TO INFECTIOUS ORGANISM, UNSPECIFIED LATERALITY, UNSPECIFIED PART OF LUNG: Primary | ICD-10-CM

## 2017-04-17 DIAGNOSIS — I50.32 CHRONIC DIASTOLIC HEART FAILURE: ICD-10-CM

## 2017-04-17 DIAGNOSIS — S22.49XA CLOSED FRACTURE OF MULTIPLE RIBS, UNSPECIFIED LATERALITY, INITIAL ENCOUNTER: ICD-10-CM

## 2017-04-17 DIAGNOSIS — I25.10 CORONARY ARTERY DISEASE, ANGINA PRESENCE UNSPECIFIED, UNSPECIFIED VESSEL OR LESION TYPE, UNSPECIFIED WHETHER NATIVE OR TRANSPLANTED HEART: ICD-10-CM

## 2017-04-17 DIAGNOSIS — R06.09 DOE (DYSPNEA ON EXERTION): ICD-10-CM

## 2017-04-17 DIAGNOSIS — I10 ESSENTIAL HYPERTENSION: ICD-10-CM

## 2017-04-17 DIAGNOSIS — I35.0 AORTIC VALVE STENOSIS, UNSPECIFIED ETIOLOGY: ICD-10-CM

## 2017-04-17 DIAGNOSIS — N18.9 CKD (CHRONIC KIDNEY DISEASE), UNSPECIFIED STAGE: ICD-10-CM

## 2017-04-17 NOTE — TELEPHONE ENCOUNTER
----- Message from Tamia Blount sent at 4/17/2017  8:38 AM CDT -----  Contact: pt granddaughter - Ashley  pt granddaughter - Ashley [phone],,,,  Pt was seen last week and they have been witting on oxygen and home health care,, pt fell over weekend and went back to ER,,, please call her back

## 2017-04-17 NOTE — TELEPHONE ENCOUNTER
----- Message from Andrea Avelar sent at 4/17/2017 12:03 PM CDT -----  Contact: [facility]-[phone]  Would like to consult with the nurse about a new order for home health. Would like to use the company  [facility],please call back to consult @ [phone].  Thanks-[facility]

## 2017-04-18 NOTE — TELEPHONE ENCOUNTER
----- Message from Madonna Silverio sent at 4/18/2017  9:47 AM CDT -----  Contact: julio/[facility]  Caller states that pt has no out of network coverage so they can not see him. Caller states that pt will have to see someone in network.  ....[phone]

## 2017-04-18 NOTE — TELEPHONE ENCOUNTER
----- Message from Thuy Wei sent at 4/18/2017  3:33 PM CDT -----  Felisa with [facility] at [phone]//states your office sent over paperwork for home health//pt has [employer] instead of [employer]//calling to inform you//asha/douglas

## 2017-04-18 NOTE — TELEPHONE ENCOUNTER
One more time, I had sent the last referral and they could not service the pt   i spoke with this company and they take his insurance and they service where is is located

## 2017-04-18 NOTE — TELEPHONE ENCOUNTER
----- Message from Marti Norman sent at 4/18/2017  9:57 AM CDT -----  Ashley, granddaughter, #[phone], is requesting to speak with the nurse in regards to home health and oxygen orders./

## 2017-04-19 ENCOUNTER — TELEPHONE (OUTPATIENT)
Dept: INTERNAL MEDICINE | Facility: CLINIC | Age: 82
End: 2017-04-19

## 2017-04-19 NOTE — TELEPHONE ENCOUNTER
----- Message from Nini Whittaker sent at 4/19/2017  1:01 PM CDT -----  Contact: Viviana Intake Nurse at [facility]  [phone]  They recd a referral from you yest but they received word back from their authorization dept that [facility] is already in-house for this pt.

## 2017-04-20 ENCOUNTER — OFFICE VISIT (OUTPATIENT)
Dept: INTERNAL MEDICINE | Facility: CLINIC | Age: 82
End: 2017-04-20
Payer: MEDICARE

## 2017-04-20 VITALS
DIASTOLIC BLOOD PRESSURE: 76 MMHG | WEIGHT: 199.94 LBS | SYSTOLIC BLOOD PRESSURE: 126 MMHG | HEART RATE: 72 BPM | TEMPERATURE: 98 F | BODY MASS INDEX: 32.13 KG/M2 | OXYGEN SATURATION: 86 % | HEIGHT: 66 IN

## 2017-04-20 DIAGNOSIS — R55 SYNCOPE, UNSPECIFIED SYNCOPE TYPE: ICD-10-CM

## 2017-04-20 DIAGNOSIS — I50.32 CHRONIC DIASTOLIC HEART FAILURE: Chronic | ICD-10-CM

## 2017-04-20 DIAGNOSIS — J96.12 CHRONIC RESPIRATORY FAILURE WITH HYPOXIA AND HYPERCAPNIA: ICD-10-CM
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
## 2017-04-20 DIAGNOSIS — J96.11 CHRONIC RESPIRATORY FAILURE WITH HYPOXIA AND HYPERCAPNIA: ICD-10-CM

## 2017-04-20 DIAGNOSIS — S22.31XA CLOSED FRACTURE OF ONE RIB OF RIGHT SIDE, INITIAL ENCOUNTER: ICD-10-CM

## 2017-04-20 PROBLEM — J96.20 ACUTE ON CHRONIC RESPIRATORY FAILURE: Status: RESOLVED | Noted: 2017-03-29 | Resolved: 2017-04-20

## 2017-04-20 PROCEDURE — 99499 UNLISTED E&M SERVICE: CPT | Mod: S$GLB,,, | Performed by: FAMILY MEDICINE

## 2017-04-20 PROCEDURE — 1160F RVW MEDS BY RX/DR IN RCRD: CPT | Mod: S$GLB,,, | Performed by: FAMILY MEDICINE

## 2017-04-20 PROCEDURE — 99214 OFFICE O/P EST MOD 30 MIN: CPT | Mod: S$GLB,,, | Performed by: FAMILY MEDICINE

## 2017-04-20 PROCEDURE — 1157F ADVNC CARE PLAN IN RCRD: CPT | Mod: S$GLB,,, | Performed by: FAMILY MEDICINE

## 2017-04-20 PROCEDURE — 1125F AMNT PAIN NOTED PAIN PRSNT: CPT | Mod: S$GLB,,, | Performed by: FAMILY MEDICINE

## 2017-04-20 PROCEDURE — 99999 PR PBB SHADOW E&M-EST. PATIENT-LVL III: CPT | Mod: PBBFAC,,, | Performed by: FAMILY MEDICINE

## 2017-04-20 PROCEDURE — 1159F MED LIST DOCD IN RCRD: CPT | Mod: S$GLB,,, | Performed by: FAMILY MEDICINE

## 2017-04-20 RX ORDER — HYDROCODONE BITARTRATE AND ACETAMINOPHEN 5; 325 MG/1; MG/1
1 TABLET ORAL EVERY 8 HOURS PRN
Qty: 18 TABLET | Refills: 0 | Status: SHIPPED | OUTPATIENT
Start: 2017-04-20 | End: 2017-05-16

## 2017-04-20 NOTE — PROGRESS NOTES
Chief Complaint:    Chief Complaint   Patient presents with    Follow-up     fall/O2       History of Present Illness:    Patient presents today he has an underlying history of heart failure, A. fib asthma cardiomyopathy coronary artery disease.  Few days back he had an incident that he was walking and suddenly he fell possibly he thinks he lost consciousness and fell and then woke up to find himself on the floor.  He was taken to the ED where he had a full workup including CAT scan of the head and the neck which was essentially normal.  Chest x-ray did show some with fractures.  He complained of some right hip pain and had x-rays done of the right hip which did not show any fracture but he still has slight limp when he walks.  Usually wears oxygen level drops becomes disoriented.  He denied any having any chest pain.  Has not been able to get his portable oxygen due to technical issues with oxygen company not servicing that area.    ROS:  Review of Systems   Constitutional: Negative for activity change, chills, fatigue, fever and unexpected weight change.   HENT: Negative for congestion, ear discharge, ear pain, hearing loss, postnasal drip and rhinorrhea.    Eyes: Negative for pain and visual disturbance.   Respiratory: Negative for cough, chest tightness and shortness of breath.    Cardiovascular: Negative for chest pain and palpitations.   Gastrointestinal: Negative for abdominal pain, diarrhea and vomiting.   Endocrine: Negative for heat intolerance.   Genitourinary: Negative for dysuria, flank pain, frequency and hematuria.   Musculoskeletal: Positive for back pain. Negative for gait problem and neck pain.   Skin: Negative for color change and rash.   Neurological: Negative for dizziness, tremors, seizures, numbness and headaches.   Psychiatric/Behavioral: Negative for agitation, hallucinations, self-injury, sleep disturbance and suicidal ideas. The patient is not nervous/anxious.        Past Medical  History:   Diagnosis Date    *Atrial fibrillation     Acute decompensated heart failure 4/8/2014    Acute on chronic respiratory failure 3/29/2017    Anticoagulant long-term use     aortic stenosis     degenerative- trileaflets    Aortic stenosis, severe 4/9/2014    Aortic valve disorders 5/13/2014    Arthritis     Asthma     Atrial fibrillation with RVR 1/30/2014    Atrial fibrillation, permanent 8/7/2013    Bilateral pneumonia 3/29/2017    Cancer 1970s, 2006, 2009    skin-removed from nose    Cardiomyopathy     Carotid atherosclerosis 6/27/2016    CHF (congestive heart failure)     CKD (chronic kidney disease) stage 3, GFR 30-59 ml/min     Coronary artery disease     Coronary artery disease involving native coronary artery of native heart without angina pectoris 10/29/2014    Per patient s/p stents x2     DDD (degenerative disc disease), cervical     Heart murmur     History of coronary angioplasty 10/3/2014    Hypertension     Hypotension, postural 8/7/2013    Pleural effusion     x3 total Thoracentesis 3/11/14 and 2/14/14     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    Severe aortic stenosis 4/9/2014    Syncope and collapse 2/3/2014    Tobacco dependence     resolved       Social History:  Social History     Social History    Marital status:      Spouse name: N/A    Number of children: N/A    Years of education: N/A     Social History Main Topics    Smoking status: Former Smoker     Packs/day: 1.00     Years: 25.00     Quit date: 8/7/1968    Smokeless tobacco: Never Used    Alcohol use No    Drug use: No    Sexual activity: No     Other Topics Concern    None     Social History Narrative       Family History:   family history includes Emphysema in his sister; Heart attack in his brother, brother, and sister; Heart disease in his brother, brother, father, and sister; Hypertension in his brother; No Known Problems in his brother; Pulmonary embolism in  "his mother; Stroke in his brother. There is no history of Anemia, Arrhythmia, Asthma, Clotting disorder, Fainting, Heart failure, Hyperlipidemia, Atrial Septal Defect, Cancer, Diabetes, or Kidney disease.    Health Maintenance   Topic Date Due    Pneumococcal (65+) (2 of 2 - PPSV23) 07/08/2017    Lipid Panel  07/08/2017    TETANUS VACCINE  11/18/2024    Zoster Vaccine  Completed    Influenza Vaccine  Completed       Physical Exam:    Vital Signs  Temp: 98.4 °F (36.9 °C)  Temp src: Tympanic  Pulse: 72  SpO2: (!) 86 %  BP: 126/76  Pain Score:   4  Pain Loc: Rib Cage  Height and Weight  Height: 5' 6" (167.6 cm)  Weight: 90.7 kg (199 lb 15.3 oz)  BSA (Calculated - sq m): 2.06 sq meters  BMI (Calculated): 32.3  Weight in (lb) to have BMI = 25: 154.6]    Body mass index is 32.27 kg/(m^2).    Physical Exam   Constitutional: He is oriented to person, place, and time. He appears well-developed.   HENT:   Mouth/Throat: Oropharynx is clear and moist.   Eyes: Conjunctivae are normal. Pupils are equal, round, and reactive to light.   Neck: Normal range of motion. Neck supple.   Cardiovascular: Normal rate, regular rhythm and normal heart sounds.    No murmur heard.  Pulmonary/Chest: Effort normal and breath sounds normal. No respiratory distress. He has no wheezes. He has no rales. He exhibits no tenderness.   Abdominal: Soft. He exhibits no distension and no mass. There is no tenderness. There is no guarding.   Musculoskeletal: He exhibits no edema or tenderness.        Legs:  Lymphadenopathy:     He has no cervical adenopathy.   Neurological: He is alert and oriented to person, place, and time. He has normal reflexes.   Skin: Skin is warm and dry.   Psychiatric: He has a normal mood and affect. His behavior is normal. Judgment and thought content normal.       Lab Results   Component Value Date    CHOL 169 07/08/2016    CHOL 83 (L) 11/18/2014    CHOL 212 (H) 11/18/2013    TRIG 539 (H) 07/08/2016    TRIG 94 11/18/2014    " TRIG 214 (H) 11/18/2013    HDL 35 (L) 07/08/2016    HDL 29 (L) 11/18/2014    HDL 38 (L) 11/18/2013    TOTALCHOLEST 4.8 07/08/2016    TOTALCHOLEST 2.9 11/18/2014    TOTALCHOLEST 5.6 (H) 11/18/2013    NONHDLCHOL 134 07/08/2016    NONHDLCHOL 54 11/18/2014    NONHDLCHOL 174 11/18/2013       Lab Results   Component Value Date    HGBA1C 5.9 07/08/2016       Assessment:      ICD-10-CM ICD-9-CM   1. Fall, initial encounter W19.XXXA E888.9   2. Closed fracture of one rib of right side, initial encounter S22.31XA 807.01   3. Syncope, unspecified syncope type R55 780.2   4. Chronic diastolic heart failure I50.32 428.32         Plan:    Patient's ED records reviewed his labs and imaging studies are all normal.  I suspect that the fall probably had it happen because of desaturation due to lack of oxygen.  He needs to wear his oxygen at all times.  They're willing to go with [facility] will send a request to them to get oxygen to him ASA and he must have a portable oxygen with him at all times.  He will continue current medication  One-time supply of hydrocodone given for hip pain start to space it out.  Continue the medication  Please do discuss this episode with cardiology should they decide to do any cardiac monitor.  No orders of the defined types were placed in this encounter.      Current Outpatient Prescriptions   Medication Sig Dispense Refill    albuterol-ipratropium 2.5mg-0.5mg/3mL (DUONEB) 0.5 mg-3 mg(2.5 mg base)/3 mL nebulizer solution Take 3 mLs by nebulization every 6 (six) hours as needed for Wheezing. Rescue 3 mL 12    aspirin (ECOTRIN) 81 MG EC tablet Take 81 mg by mouth every evening.      atenolol (TENORMIN) 25 MG tablet TAKE 2 TABLETS ONE TIME DAILY 180 tablet 3    atorvastatin (LIPITOR) 80 MG tablet TAKE 1 TABLET ONE TIME DAILY 90 tablet 4    CARTIA  mg Cp24 TAKE 1 CAPSULE EVERY DAY 90 capsule 3    clopidogrel (PLAVIX) 75 mg tablet TAKE 1 TABLET ONE TIME DAILY 90 tablet 3    co-enzyme Q-10 50  mg capsule Take 50 mg by mouth once daily.      furosemide (LASIX) 40 MG tablet TAKE 1 TABLET EVERY DAY 90 tablet 3    GLUCOSAMINE HCL/CHONDR HOLT A NA (OSTEO BI-FLEX ORAL) Take by mouth 2 (two) times daily.      hydrocodone-acetaminophen 5-325mg (NORCO) 5-325 mg per tablet Take 1 tablet by mouth every 8 (eight) hours as needed for Pain. 18 tablet 0    isosorbide mononitrate (IMDUR) 120 MG 24 hr tablet TAKE 1 TABLET EVERY DAY 90 tablet 3    magnesium oxide (MAG-OX) 400 mg tablet Take 400 mg by mouth 2 (two) times daily.       MULTIVITAMIN W-MINERALS/LUTEIN (CENTRUM SILVER ORAL) Take by mouth.      nebulizer and compressor Rebeca 1 Units by Misc.(Non-Drug; Combo Route) route 4 (four) times daily as needed. 1 each 12    pantoprazole (PROTONIX) 40 MG tablet TAKE 1 TABLET ONE TIME DAILY 90 tablet 4    potassium chloride SA (K-DUR,KLOR-CON) 20 MEQ tablet TAKE 1 TABLET ONE TIME DAILY 90 tablet 3    ranolazine (RANEXA) 500 MG Tb12 Take 1 tablet (500 mg total) by mouth 2 (two) times daily. 180 tablet 3    Saccharomyces boulardii (FLORASTOR) 250 mg capsule Take 1 capsule (250 mg total) by mouth 2 (two) times daily. 60 capsule 0    tamsulosin (FLOMAX) 0.4 mg Cp24 Take 1 capsule (0.4 mg total) by mouth once daily. 90 capsule 3     No current facility-administered medications for this visit.        Medications Discontinued During This Encounter   Medication Reason    hydrocodone-acetaminophen 5-325mg (NORCO) 5-325 mg per tablet Reorder       No Follow-up on file.      Dr Blaire Kent MD    Disclaimer: This note is prepared using voice recognition system and as such is likely to have errors and is not proof read.

## 2017-04-21 ENCOUNTER — TELEPHONE (OUTPATIENT)
Dept: CARDIOLOGY | Facility: CLINIC | Age: 82
End: 2017-04-21

## 2017-04-21 NOTE — TELEPHONE ENCOUNTER
Spoke with [employer] and the representative stated that she needed the oxygen saturation of patient. She was going to submit it with just the one reading we had at rest on room air. If needing the walk test with and without O2 they would contact our office.

## 2017-04-21 NOTE — TELEPHONE ENCOUNTER
----- Message from Briana Donis sent at 4/21/2017  2:31 PM CDT -----  Contact: Piedad ( [facility] [place])   Piedad ( [facility] [place]) is requesting a call from nurse to f/u on some orders for the pt.          Please call Piedad ( [facility] [place]) back at [phone] ( office )

## 2017-04-24 ENCOUNTER — TELEPHONE (OUTPATIENT)
Dept: INTERNAL MEDICINE | Facility: CLINIC | Age: 82
End: 2017-04-24

## 2017-04-24 NOTE — TELEPHONE ENCOUNTER
----- Message from Blaire Kent MD sent at 4/24/2017 10:27 AM CDT -----  Take care of this.        ----- Message -----     From: Ashley España     Sent: 4/24/2017   8:37 AM       To: Blaire Kent MD    I received a prescription for oxygen for Mr. Ferraro, he is a current patient of [facility] [facility].  He has been with their company since 01/2016.  I can only accept transfers within the first 12 months of service.  If you have any questions, I can be reached at [phone].  Thank you, Ashley

## 2017-04-24 NOTE — TELEPHONE ENCOUNTER
----- Message from Sugar Weldon sent at 4/24/2017  4:19 PM CDT -----  Contact: [facility]/Nicole  State she need clinic notes, H&P, front and back of insurance card and saturation testing. Please fax to [phone] or call Nicole at [phone] option 2 and option 2 again. Thank you

## 2017-04-25 DIAGNOSIS — I25.10 CORONARY ARTERY DISEASE, OCCLUSIVE: ICD-10-CM

## 2017-04-25 DIAGNOSIS — I50.32 CHRONIC DIASTOLIC HEART FAILURE: ICD-10-CM

## 2017-04-25 RX ORDER — RANOLAZINE 500 MG/1
TABLET, FILM COATED, EXTENDED RELEASE ORAL
Qty: 180 TABLET | Refills: 3 | Status: SHIPPED | OUTPATIENT
Start: 2017-04-25

## 2017-04-25 NOTE — TELEPHONE ENCOUNTER
----- Message from Eileen Maurice sent at 4/25/2017 11:52 AM CDT -----  Contact: Patients granddaughter Ashley Ramirez states she has found a new company for her grandfathers oxygen supplies, please call her back at [phone]. Thank you

## 2017-04-26 ENCOUNTER — TELEPHONE (OUTPATIENT)
Dept: INTERNAL MEDICINE | Facility: CLINIC | Age: 82
End: 2017-04-26

## 2017-04-26 NOTE — TELEPHONE ENCOUNTER
This was taken care of in another message. i sent his rx to [facility].   [facility] do not service the area he is currently living in

## 2017-04-26 NOTE — TELEPHONE ENCOUNTER
----- Message from Blaire Kent MD sent at 4/25/2017 10:51 PM CDT -----  Is this being taken care of?        ----- Message -----     From: Ashley España     Sent: 4/24/2017   8:37 AM       To: Blaire Kent MD    I received a prescription for oxygen for Mr. Ferraro, he is a current patient of [facility] [facility].  He has been with their company since 01/2016.  I can only accept transfers within the first 12 months of service.  If you have any questions, I can be reached at [phone].  Thank you, Ashley

## 2017-04-29 PROCEDURE — 99495 TRANSJ CARE MGMT MOD F2F 14D: CPT | Mod: S$GLB,,, | Performed by: FAMILY MEDICINE

## 2017-05-03 ENCOUNTER — OFFICE VISIT (OUTPATIENT)
Dept: CARDIOLOGY | Facility: CLINIC | Age: 82
End: 2017-05-03
Payer: MEDICARE

## 2017-05-03 VITALS
HEART RATE: 68 BPM | DIASTOLIC BLOOD PRESSURE: 50 MMHG | HEIGHT: 66 IN | WEIGHT: 196 LBS | BODY MASS INDEX: 31.5 KG/M2 | SYSTOLIC BLOOD PRESSURE: 120 MMHG

## 2017-05-03 DIAGNOSIS — I25.10 CORONARY ARTERY DISEASE, OCCLUSIVE: Primary | Chronic | ICD-10-CM

## 2017-05-03 DIAGNOSIS — I10 ESSENTIAL HYPERTENSION: Chronic | ICD-10-CM

## 2017-05-03 DIAGNOSIS — Z86.79 HISTORY OF AORTIC STENOSIS: Chronic | ICD-10-CM

## 2017-05-03 DIAGNOSIS — I50.32 CHRONIC DIASTOLIC HEART FAILURE: Chronic | ICD-10-CM

## 2017-05-03 PROCEDURE — 1159F MED LIST DOCD IN RCRD: CPT | Mod: S$GLB,,, | Performed by: NUCLEAR MEDICINE

## 2017-05-03 PROCEDURE — 1126F AMNT PAIN NOTED NONE PRSNT: CPT | Mod: S$GLB,,, | Performed by: NUCLEAR MEDICINE

## 2017-05-03 PROCEDURE — 99214 OFFICE O/P EST MOD 30 MIN: CPT | Mod: S$GLB,,, | Performed by: NUCLEAR MEDICINE

## 2017-05-03 PROCEDURE — 99499 UNLISTED E&M SERVICE: CPT | Mod: S$GLB,,, | Performed by: NUCLEAR MEDICINE

## 2017-05-03 PROCEDURE — 1157F ADVNC CARE PLAN IN RCRD: CPT | Mod: S$GLB,,, | Performed by: NUCLEAR MEDICINE

## 2017-05-03 PROCEDURE — 99999 PR PBB SHADOW E&M-EST. PATIENT-LVL III: CPT | Mod: PBBFAC,,, | Performed by: NUCLEAR MEDICINE

## 2017-05-03 PROCEDURE — 1160F RVW MEDS BY RX/DR IN RCRD: CPT | Mod: S$GLB,,, | Performed by: NUCLEAR MEDICINE

## 2017-05-03 NOTE — MR AVS SNAPSHOT
[facility]  [address] [facility] [address]  Phone: [phone]  Fax: [phone]                  Refugio Ferraro   5/3/2017 1:20 PM   Office Visit    Description:  Male : 1926   Provider:  Gregorio Farr MD   Department:  [facility]           Reason for Visit     Coronary Artery Disease     Valvular Heart Disease           Diagnoses this Visit        Comments    Coronary artery disease, occlusive    -  Primary     Chronic diastolic heart failure         History of aortic stenosis         Essential hypertension                To Do List           Future Appointments        Provider Department Dept Phone    2017 9:30 AM ECHO, [facility] - Echo/Stress Lab [phone]    2017 10:40 AM RESEARCH, CARDIOLOGY-OP [facility] [phone]    2017 11:00 AM Lorena Moore PA-C [facility]-Interventional Card [phone]    2017 11:30 AM NOM [id number] 485 LB LIMIT [facility] [phone]    2017 11:50 AM LAB, APPOINTMENT [place] [facility] [phone]      Goals (5 Years of Data)     None      Follow-Up and Disposition     Return in about 4 months (around 9/3/2017).      [facility]     [facility] Nurse Care Line - 24/ Assistance  Unless otherwise directed by your provider, please contact [facility], our nurse care line that is available for  assistance.     Registered nurses in the [facility] provide: appointment scheduling, clinical advisement, health education, and other advisory services.  Call: [phone] (toll free)               Medications           Message regarding Medications     Verify the changes and/or additions to your medication regime listed below are the same as discussed with your clinician today.  If any of these changes or additions are incorrect, please notify your healthcare provider.             Verify that the below  "list of medications is an accurate representation of the medications you are currently taking.  If none reported, the list may be blank. If incorrect, please contact your healthcare provider. Carry this list with you in case of emergency.           Current Medications     albuterol-ipratropium 2.5mg-0.5mg/3mL (DUONEB) 0.5 mg-3 mg(2.5 mg base)/3 mL nebulizer solution Take 3 mLs by nebulization every 6 (six) hours as needed for Wheezing. Rescue    aspirin (ECOTRIN) 81 MG EC tablet Take 81 mg by mouth every evening.    atenolol (TENORMIN) 25 MG tablet TAKE 2 TABLETS ONE TIME DAILY    atorvastatin (LIPITOR) 80 MG tablet TAKE 1 TABLET ONE TIME DAILY    CARTIA  mg Cp24 TAKE 1 CAPSULE EVERY DAY    clopidogrel (PLAVIX) 75 mg tablet TAKE 1 TABLET ONE TIME DAILY    co-enzyme Q-10 50 mg capsule Take 50 mg by mouth once daily.    furosemide (LASIX) 40 MG tablet TAKE 1 TABLET EVERY DAY    GLUCOSAMINE HCL/CHONDR HOLT A NA (OSTEO BI-FLEX ORAL) Take by mouth 2 (two) times daily.    isosorbide mononitrate (IMDUR) 120 MG 24 hr tablet TAKE 1 TABLET EVERY DAY    magnesium oxide (MAG-OX) 400 mg tablet Take 400 mg by mouth 2 (two) times daily.     MULTIVITAMIN W-MINERALS/LUTEIN (CENTRUM SILVER ORAL) Take by mouth.    nebulizer and compressor Rebeca 1 Units by Misc.(Non-Drug; Combo Route) route 4 (four) times daily as needed.    pantoprazole (PROTONIX) 40 MG tablet TAKE 1 TABLET ONE TIME DAILY    potassium chloride SA (K-DUR,KLOR-CON) 20 MEQ tablet TAKE 1 TABLET ONE TIME DAILY    RANEXA 500 mg Tb12 TAKE 1 TABLET TWICE DAILY    tamsulosin (FLOMAX) 0.4 mg Cp24 Take 1 capsule (0.4 mg total) by mouth once daily.    hydrocodone-acetaminophen 5-325mg (NORCO) 5-325 mg per tablet Take 1 tablet by mouth every 8 (eight) hours as needed for Pain.           Clinical Reference Information           Your Vitals Were     BP Pulse Height Weight BMI    120/50 (BP Location: Right arm, Patient Position: Sitting, BP Method: Manual) 68 5' 6" (1.676 m) 88.9 " kg (196 lb) 31.64 kg/m2      Blood Pressure          Most Recent Value    BP  (!)  120/50      Allergies as of 5/3/2017     No Known Allergies      Immunizations Administered on Date of Encounter - 5/3/2017     None      [facility] Sign-Up     Activating your [facility] account is as easy as 1-2-3!     1) Visit [email or web address], select Sign Up Now, enter this activation code and your date of birth, then select Next.  [email or web address]  Expires: 5/15/2017  7:58 AM      2) Create a username and password to use when you visit [facility] in the future and select a security question in case you lose your password and select Next.    3) Enter your e-mail address and click Sign Up!    Additional Information  If you have questions, please e-mail [email or web address] or call [phone] to talk to our [facility] staff. Remember, [facility] is NOT to be used for urgent needs. For medical emergencies, dial 911.         Language Assistance Services     ATTENTION: Language assistance services are available, free of charge. Please call [phone].      ATENCIÓN: Si habla español, tiene a allen disposición servicios gratuitos de asistencia lingüística. Llame al [phone].     CHÚ Ý: N?u b?n nói Ti?ng Vi?t, có các d?ch v? h? tr? ngôn ng? mi?n phí dành cho b?n. G?i s? [phone].         [facility] complies with applicable Federal civil rights laws and does not discriminate on the basis of race, color, national origin, age, disability, or sex.

## 2017-05-03 NOTE — PROGRESS NOTES
Subjective:   Patient ID:  Refugio Ferraro is a 89 y.o. male who presents for follow-up of Coronary Artery Disease (6 month followup) and Valvular Heart Disease (AS-  TAVR.)      HPI 1- CHRONIC CAD- ANGINA FC 2-3,  2-VHD, AS POST TAVR   3- HTN WITH CHRONIC HFPEF- STAGE C, FC 2-3      RECOVERING FROM PNEUMONIA.  STILL HAVING NON PRODUCTIVE COUGH AND RONCHIS  NO INCREASE YA. NO ORTHOPNEA OR PND  NO HX OF ANGINA OR PLEURITIC CP. NO PALPITATIONS  NO EDEMA. NO CALVE TENDERNESS  NO RECURRENT FEVER OR CHILLS.  NO FOCAL CNS SYMPTOMS OR SIGNS TO SUGGEST TIA OR STROKE  CARD MED - GOOD COMPLIANCE- NO SIDE EFFECTS    Review of Systems   Constitution: Negative for chills, fever, weakness, night sweats, weight gain and weight loss.   HENT: Negative for headaches and nosebleeds.    Eyes: Negative for blurred vision, double vision and visual disturbance.   Cardiovascular: Negative for chest pain, dyspnea on exertion, irregular heartbeat, leg swelling, orthopnea, palpitations, paroxysmal nocturnal dyspnea and syncope.   Respiratory: Negative for cough, hemoptysis and wheezing.    Endocrine: Negative for polydipsia and polyuria.   Hematologic/Lymphatic: Does not bruise/bleed easily.   Skin: Negative for rash.   Musculoskeletal: Negative for joint pain, joint swelling, muscle weakness and myalgias.   Gastrointestinal: Negative for abdominal pain, hematemesis, jaundice and melena.   Genitourinary: Negative for dysuria, hematuria and nocturia.   Neurological: Negative for dizziness, focal weakness and sensory change.   Psychiatric/Behavioral: Negative for depression. The patient does not have insomnia and is not nervous/anxious.      Family History   Problem Relation Age of Onset    Heart disease Father     Heart attack Brother     Heart disease Brother     Heart attack Brother     Stroke Brother     Heart disease Brother     Hypertension Brother     Heart attack Sister     Heart disease Sister     Emphysema Sister      Pulmonary embolism Mother     No Known Problems Brother     Anemia Neg Hx     Arrhythmia Neg Hx     Asthma Neg Hx     Clotting disorder Neg Hx     Fainting Neg Hx     Heart failure Neg Hx     Hyperlipidemia Neg Hx     Atrial Septal Defect Neg Hx     Cancer Neg Hx     Diabetes Neg Hx     Kidney disease Neg Hx      Past Medical History:   Diagnosis Date    *Atrial fibrillation     Acute decompensated heart failure 4/8/2014    Acute on chronic respiratory failure 3/29/2017    Anticoagulant long-term use     aortic stenosis     degenerative- trileaflets    Aortic stenosis, severe 4/9/2014    Aortic valve disorders 5/13/2014    Arthritis     Asthma     Atrial fibrillation with RVR 1/30/2014    Atrial fibrillation, permanent 8/7/2013    Bilateral pneumonia 3/29/2017    Cancer 1970s, 2006, 2009    skin-removed from nose    Cardiomyopathy     Carotid atherosclerosis 6/27/2016    CHF (congestive heart failure)     CKD (chronic kidney disease) stage 3, GFR 30-59 ml/min     Coronary artery disease     Coronary artery disease involving native coronary artery of native heart without angina pectoris 10/29/2014    Per patient s/p stents x2     DDD (degenerative disc disease), cervical     Heart murmur     History of coronary angioplasty 10/3/2014    Hypertension     Hypotension, postural 8/7/2013    Pleural effusion     x3 total Thoracentesis 3/11/14 and 2/14/14     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 Rescue 3 mL 12    aspirin (ECOTRIN) 81 MG EC tablet Take 81 mg by mouth every evening.      atenolol  (TENORMIN) 25 MG tablet TAKE 2 TABLETS ONE TIME DAILY 180 tablet 3    atorvastatin (LIPITOR) 80 MG tablet TAKE 1 TABLET ONE TIME DAILY 90 tablet 4    CARTIA  mg Cp24 TAKE 1 CAPSULE EVERY DAY 90 capsule 3    clopidogrel (PLAVIX) 75 mg tablet TAKE 1 TABLET ONE TIME DAILY 90 tablet 3    co-enzyme Q-10 50 mg capsule Take 50 mg by mouth once daily.      furosemide (LASIX) 40 MG tablet TAKE 1 TABLET EVERY DAY 90 tablet 3    GLUCOSAMINE HCL/CHONDR HOLT A NA (OSTEO BI-FLEX ORAL) Take by mouth 2 (two) times daily.      isosorbide mononitrate (IMDUR) 120 MG 24 hr tablet TAKE 1 TABLET EVERY DAY 90 tablet 3    magnesium oxide (MAG-OX) 400 mg tablet Take 400 mg by mouth 2 (two) times daily.       MULTIVITAMIN W-MINERALS/LUTEIN (CENTRUM SILVER ORAL) Take by mouth.      nebulizer and compressor Rebeca 1 Units by Misc.(Non-Drug; Combo Route) route 4 (four) times daily as needed. 1 each 12    pantoprazole (PROTONIX) 40 MG tablet TAKE 1 TABLET ONE TIME DAILY 90 tablet 4    potassium chloride SA (K-DUR,KLOR-CON) 20 MEQ tablet TAKE 1 TABLET ONE TIME DAILY 90 tablet 3    RANEXA 500 mg Tb12 TAKE 1 TABLET TWICE DAILY 180 tablet 3    tamsulosin (FLOMAX) 0.4 mg Cp24 Take 1 capsule (0.4 mg total) by mouth once daily. 90 capsule 3    hydrocodone-acetaminophen 5-325mg (NORCO) 5-325 mg per tablet Take 1 tablet by mouth every 8 (eight) hours as needed for Pain. 18 tablet 0     No current facility-administered medications on file prior to visit.      Review of patient's allergies indicates:  No Known Allergies    Objective:     Physical Exam   Constitutional: He is oriented to person, place, and time. He appears well-developed. No distress.   HENT:   Head: Normocephalic.   Eyes: Conjunctivae are normal. Pupils are equal, round, and reactive to light.   Neck: Neck supple. No JVD present. No thyromegaly present.   Cardiovascular: Normal rate, regular rhythm, normal heart sounds and intact distal pulses.  Exam reveals no gallop and  no friction rub.    No murmur heard.  Pulses:       Carotid pulses are 2+ on the right side, and 2+ on the left side.       Radial pulses are 2+ on the right side, and 2+ on the left side.        Femoral pulses are 2+ on the right side, and 2+ on the left side.       Popliteal pulses are 2+ on the right side, and 2+ on the left side.        Dorsalis pedis pulses are 2+ on the right side, and 2+ on the left side.        Posterior tibial pulses are 2+ on the right side, and 2+ on the left side.   Pulmonary/Chest: He has no wheezes. He has rhonchi in the right upper field and the left upper field. He has no rales. He exhibits no tenderness.   Abdominal: Soft. Bowel sounds are normal. He exhibits no mass. There is no hepatosplenomegaly. There is no tenderness.   Musculoskeletal: He exhibits no edema or tenderness.        Cervical back: Normal.        Thoracic back: Normal.        Lumbar back: Normal.   Lymphadenopathy:     He has no cervical adenopathy.     He has no axillary adenopathy.        Right: No supraclavicular adenopathy present.        Left: No supraclavicular adenopathy present.   Neurological: He is alert and oriented to person, place, and time. He has normal strength. No sensory deficit. Gait normal.   Skin: Skin is warm. No cyanosis. No pallor. Nails show no clubbing.   Psychiatric: He has a normal mood and affect. His speech is normal and behavior is normal. Cognition and memory are normal.       Assessment:     1. Coronary artery disease, occlusive    2. Chronic diastolic heart failure    3. History of aortic stenosis, S/P TAVR    4. Essential hypertension      NO EVIDENCE OF ACTIVE MYOCARDIAL ISCHEMIA  NO ARRHYTHMIAS.  WELL COMPENSATED HF  WELL FUNCTIONING PROSTHETIC AV.  CNS STATUS STABLE  Plan:     Coronary artery disease, occlusive    Chronic diastolic heart failure    History of aortic stenosis, S/P TAVR    Essential hypertension      1- CONTINUE PRESENT CARD MANAGEMENT    2- RETURN IN 4 MONTHS.

## 2017-05-04 ENCOUNTER — HOSPITAL ENCOUNTER (OUTPATIENT)
Dept: RADIOLOGY | Facility: HOSPITAL | Age: 82
Discharge: HOME | End: 2017-05-04
Attending: FAMILY MEDICINE
Payer: MEDICARE

## 2017-05-04 ENCOUNTER — OFFICE VISIT (OUTPATIENT)
Dept: INTERNAL MEDICINE | Facility: CLINIC | Age: 82
End: 2017-05-04
Payer: MEDICARE

## 2017-05-04 VITALS
DIASTOLIC BLOOD PRESSURE: 60 MMHG | HEART RATE: 73 BPM | BODY MASS INDEX: 32.06 KG/M2 | TEMPERATURE: 97 F | OXYGEN SATURATION: 92 % | WEIGHT: 199.5 LBS | HEIGHT: 66 IN | SYSTOLIC BLOOD PRESSURE: 126 MMHG

## 2017-05-04 DIAGNOSIS — J45.901 ASTHMA EXACERBATION IN COPD: ICD-10-CM

## 2017-05-04 DIAGNOSIS — J45.901 ASTHMA EXACERBATION IN COPD: Primary | ICD-10-CM

## 2017-05-04 DIAGNOSIS — J44.1 ASTHMA EXACERBATION IN COPD: Primary | ICD-10-CM

## 2017-05-04 DIAGNOSIS — J44.1 ASTHMA EXACERBATION IN COPD: ICD-10-CM

## 2017-05-04 PROCEDURE — 1157F ADVNC CARE PLAN IN RCRD: CPT | Mod: S$GLB,,, | Performed by: FAMILY MEDICINE

## 2017-05-04 PROCEDURE — 99999 PR PBB SHADOW E&M-EST. PATIENT-LVL IV: CPT | Mod: 25,PBBFAC,, | Performed by: FAMILY MEDICINE

## 2017-05-04 PROCEDURE — 1160F RVW MEDS BY RX/DR IN RCRD: CPT | Mod: S$GLB,,, | Performed by: FAMILY MEDICINE

## 2017-05-04 PROCEDURE — 71020 XR CHEST PA AND LATERAL: CPT | Mod: 26,,, | Performed by: RADIOLOGY

## 2017-05-04 PROCEDURE — 71020 XR CHEST PA AND LATERAL: CPT | Mod: TC,PO

## 2017-05-04 PROCEDURE — 1159F MED LIST DOCD IN RCRD: CPT | Mod: S$GLB,,, | Performed by: FAMILY MEDICINE

## 2017-05-04 PROCEDURE — 99499 UNLISTED E&M SERVICE: CPT | Mod: S$GLB,,, | Performed by: FAMILY MEDICINE

## 2017-05-04 PROCEDURE — 99213 OFFICE O/P EST LOW 20 MIN: CPT | Mod: S$GLB,,, | Performed by: FAMILY MEDICINE

## 2017-05-04 RX ORDER — PREDNISONE 20 MG/1
20 TABLET ORAL DAILY
Qty: 20 TABLET | Refills: 0 | Status: SHIPPED | OUTPATIENT
Start: 2017-05-04 | End: 2017-05-16

## 2017-05-04 RX ORDER — AZITHROMYCIN 250 MG/1
250 TABLET, FILM COATED ORAL DAILY
Qty: 6 TABLET | Refills: 0 | Status: SHIPPED | OUTPATIENT
Start: 2017-05-04 | End: 2017-05-09

## 2017-05-04 NOTE — MR AVS SNAPSHOT
[facility]  [address] [place] [id number]  Phone: [phone]                  Refugio Ferraro   2017 2:40 PM   Office Visit    Description:  Male : 1926   Provider:  Blaire Kent MD   Department:  [facility]           Reason for Visit     Cough           Diagnoses this Visit        Comments    Asthma exacerbation in COPD    -  Primary            To Do List           Future Appointments        Provider Department Dept Phone    2017 9:30 AM ECHO, [facility] - Echo/Stress Lab [phone]    2017 10:40 AM RESEARCH, CARDIOLOGY-OP [facility] [phone]    2017 11:00 AM Lorena Moore PA-C [facility]-Interventional Card [phone]    2017 11:30 AM NOM [id number] LB LIMIT [facility] [phone]    2017 11:50 AM LAB, APPOINTMENT [place] [facility] [phone]      Goals (5 Years of Data)     None       These Medications        Disp Refills Start End    predniSONE (DELTASONE) 20 MG tablet 20 tablet 0 2017     Take 1 tablet (20 mg total) by mouth once daily. Take 3 tab daily x 3 dys, then 2 tabs daily x 3 dys, then 1 tab x 3 dys. Then stop - Oral    Pharmacy: [facility] [address] [place] [address] Ph #: [phone]       azithromycin (ZITHROMAX Z-DEEPAK) 250 MG tablet 6 tablet 0 2017    Take 1 tablet (250 mg total) by mouth once daily. Take 2 tabs on day 1 thereafter one tab daily by mouth for 4 days. - Oral    Pharmacy: [facility] [address] [place] [address] Ph #: [phone]       Notes to Pharmacy: Pharmacist to go over side effects.      [facility]     [facility] Nurse Care Line - 24/7 Assistance  Unless otherwise directed by your provider, please contact [facility], our nurse care line that is available for 24/7 assistance.     Registered nurses in the  [facility] provide: appointment scheduling, clinical advisement, health education, and other advisory services.  Call: [phone] (toll free)               Medications           Message regarding Medications     Verify the changes and/or additions to your medication regime listed below are the same as discussed with your clinician today.  If any of these changes or additions are incorrect, please notify your healthcare provider.        START taking these NEW medications        Refills    predniSONE (DELTASONE) 20 MG tablet 0    Sig: Take 1 tablet (20 mg total) by mouth once daily. Take 3 tab daily x 3 dys, then 2 tabs daily x 3 dys, then 1 tab x 3 dys. Then stop    Class: Print    Route: Oral    azithromycin (ZITHROMAX Z-DEEPAK) 250 MG tablet 0    Sig: Take 1 tablet (250 mg total) by mouth once daily. Take 2 tabs on day 1 thereafter one tab daily by mouth for 4 days.    Class: Normal    Route: Oral           Verify that the below list of medications is an accurate representation of the medications you are currently taking.  If none reported, the list may be blank. If incorrect, please contact your healthcare provider. Carry this list with you in case of emergency.           Current Medications     albuterol-ipratropium 2.5mg-0.5mg/3mL (DUONEB) 0.5 mg-3 mg(2.5 mg base)/3 mL nebulizer solution Take 3 mLs by nebulization every 6 (six) hours as needed for Wheezing. Rescue    aspirin (ECOTRIN) 81 MG EC tablet Take 81 mg by mouth every evening.    atenolol (TENORMIN) 25 MG tablet TAKE 2 TABLETS ONE TIME DAILY    atorvastatin (LIPITOR) 80 MG tablet TAKE 1 TABLET ONE TIME DAILY    CARTIA  mg Cp24 TAKE 1 CAPSULE EVERY DAY    clopidogrel (PLAVIX) 75 mg tablet TAKE 1 TABLET ONE TIME DAILY    co-enzyme Q-10 50 mg capsule Take 50 mg by mouth once daily.    furosemide (LASIX) 40 MG tablet TAKE 1 TABLET EVERY DAY    GLUCOSAMINE HCL/CHONDR HOLT A NA (OSTEO BI-FLEX ORAL) Take by mouth 2 (two) times daily.     "hydrocodone-acetaminophen 5-325mg (NORCO) 5-325 mg per tablet Take 1 tablet by mouth every 8 (eight) hours as needed for Pain.    isosorbide mononitrate (IMDUR) 120 MG 24 hr tablet TAKE 1 TABLET EVERY DAY    magnesium oxide (MAG-OX) 400 mg tablet Take 400 mg by mouth 2 (two) times daily.     MULTIVITAMIN W-MINERALS/LUTEIN (CENTRUM SILVER ORAL) Take by mouth.    nebulizer and compressor Rebeca 1 Units by Misc.(Non-Drug; Combo Route) route 4 (four) times daily as needed.    pantoprazole (PROTONIX) 40 MG tablet TAKE 1 TABLET ONE TIME DAILY    potassium chloride SA (K-DUR,KLOR-CON) 20 MEQ tablet TAKE 1 TABLET ONE TIME DAILY    RANEXA 500 mg Tb12 TAKE 1 TABLET TWICE DAILY    tamsulosin (FLOMAX) 0.4 mg Cp24 Take 1 capsule (0.4 mg total) by mouth once daily.    azithromycin (ZITHROMAX Z-DEEPAK) 250 MG tablet Take 1 tablet (250 mg total) by mouth once daily. Take 2 tabs on day 1 thereafter one tab daily by mouth for 4 days.    predniSONE (DELTASONE) 20 MG tablet Take 1 tablet (20 mg total) by mouth once daily. Take 3 tab daily x 3 dys, then 2 tabs daily x 3 dys, then 1 tab x 3 dys. Then stop           Clinical Reference Information           Your Vitals Were     BP Pulse Temp Height    126/60 (BP Location: Left arm, Patient Position: Sitting, BP Method: Manual) 73 97.3 °F (36.3 °C) (Tympanic) 5' 6" (1.676 m)    Weight SpO2 BMI    90.5 kg (199 lb 8.3 oz) 92% 32.2 kg/m2      Blood Pressure          Most Recent Value    BP  126/60      Allergies as of 5/4/2017     No Known Allergies      Immunizations Administered on Date of Encounter - 5/4/2017     None      Orders Placed During Today's Visit     Future Labs/Procedures Expected by Expires    X-Ray Chest PA And Lateral  5/4/2017 5/4/2018      [facility] Sign-Up     Activating your [facility] account is as easy as 1-2-3!     1) Visit [email or web address], select Sign Up Now, enter this activation code and your date of birth, then select Next.  [email or web address]  Expires: 5/15/2017  7:58 AM  "     2) Create a username and password to use when you visit [facility] in the future and select a security question in case you lose your password and select Next.    3) Enter your e-mail address and click Sign Up!    Additional Information  If you have questions, please e-mail [email or web address] or call [phone] to talk to our [facility] staff. Remember, [facility] is NOT to be used for urgent needs. For medical emergencies, dial 911.         Language Assistance Services     ATTENTION: Language assistance services are available, free of charge. Please call [phone].      ATENCIÓN: Si habla español, tiene a allen disposición servicios gratuitos de asistencia lingüística. Llame al [phone].     CHÚ Ý: N?u b?n nói Ti?ng Vi?t, có các d?ch v? h? tr? ngôn ng? mi?n phí dành cho b?n. G?i s? [phone].         [facility] complies with applicable Federal civil rights laws and does not discriminate on the basis of race, color, national origin, age, disability, or sex.

## 2017-05-04 NOTE — PROGRESS NOTES
Chief Complaint:    Chief Complaint   Patient presents with    Cough       History of Present Illness:    Presents today complaining of some cough congestion and wheezing for the past few days.  No fever no sputum.  He does have an underlying diagnosis of COPD and asthma.  Patient was recently seen by his cardiologist and was told that his heart his heart is stable.  Uses home oxygen but is waiting to get portable oxygen.    ROS:  Review of Systems   Constitutional: Negative for appetite change, chills and fever.   HENT: Negative for congestion, ear discharge, ear pain, facial swelling, mouth sores, postnasal drip, rhinorrhea, sinus pressure, sneezing, sore throat, trouble swallowing and voice change.    Eyes: Negative for discharge, redness and itching.   Respiratory: Positive for shortness of breath and wheezing. Negative for cough and chest tightness.    Cardiovascular: Negative for chest pain.       Past Medical History:   Diagnosis Date    *Atrial fibrillation     Acute decompensated heart failure 4/8/2014    Acute on chronic respiratory failure 3/29/2017    Anticoagulant long-term use     aortic stenosis     degenerative- trileaflets    Aortic stenosis, severe 4/9/2014    Aortic valve disorders 5/13/2014    Arthritis     Asthma     Atrial fibrillation with RVR 1/30/2014    Atrial fibrillation, permanent 8/7/2013    Bilateral pneumonia 3/29/2017    Cancer 1970s, 2006, 2009    skin-removed from nose    Cardiomyopathy     Carotid atherosclerosis 6/27/2016    CHF (congestive heart failure)     CKD (chronic kidney disease) stage 3, GFR 30-59 ml/min     Coronary artery disease     Coronary artery disease involving native coronary artery of native heart without angina pectoris 10/29/2014    Per patient s/p stents x2     DDD (degenerative disc disease), cervical     Heart murmur     History of coronary angioplasty 10/3/2014    Hypertension     Hypotension, postural 8/7/2013    Pleural  "effusion     x3 total Thoracentesis 3/11/14 and 2/14/14     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    Severe aortic stenosis 4/9/2014    Syncope and collapse 2/3/2014    Tobacco dependence     resolved       Social History:  Social History     Social History    Marital status:      Spouse name: N/A    Number of children: N/A    Years of education: N/A     Social History Main Topics    Smoking status: Former Smoker     Packs/day: 1.00     Years: 25.00     Quit date: 8/7/1968    Smokeless tobacco: Never Used    Alcohol use No    Drug use: No    Sexual activity: No     Other Topics Concern    None     Social History Narrative       Family History:   family history includes Emphysema in his sister; Heart attack in his brother, brother, and sister; Heart disease in his brother, brother, father, and sister; Hypertension in his brother; No Known Problems in his brother; Pulmonary embolism in his mother; Stroke in his brother. There is no history of Anemia, Arrhythmia, Asthma, Clotting disorder, Fainting, Heart failure, Hyperlipidemia, Atrial Septal Defect, Cancer, Diabetes, or Kidney disease.    Health Maintenance   Topic Date Due    Pneumococcal (65+) (2 of 2 - PPSV23) 07/08/2017    Lipid Panel  07/08/2017    Influenza Vaccine  08/01/2017    TETANUS VACCINE  11/18/2024    Zoster Vaccine  Completed       Physical Exam:    Vital Signs  Temp: 97.3 °F (36.3 °C)  Temp src: Tympanic  Pulse: 73  SpO2: (!) 92 %  BP: 126/60  BP Location: Left arm  BP Method: Manual  Patient Position: Sitting  Height and Weight  Height: 5' 6" (167.6 cm)  Weight: 90.5 kg (199 lb 8.3 oz)  BSA (Calculated - sq m): 2.05 sq meters  BMI (Calculated): 32.3  Weight in (lb) to have BMI = 25: 154.6]    Body mass index is 32.2 kg/(m^2).    Physical Exam   Constitutional: He appears well-developed and well-nourished.   HENT:   Head: Normocephalic and atraumatic.   Right Ear: Tympanic membrane is not bulging.   Left " Ear: Tympanic membrane is not bulging.   Nose: No rhinorrhea. Right sinus exhibits no maxillary sinus tenderness and no frontal sinus tenderness. Left sinus exhibits no maxillary sinus tenderness and no frontal sinus tenderness.   Mouth/Throat: Mucous membranes are normal. No posterior oropharyngeal erythema or tonsillar abscesses.   Eyes: Conjunctivae are normal. Pupils are equal, round, and reactive to light.   Neck: Normal range of motion.   Cardiovascular: Normal rate and normal heart sounds.    Pulmonary/Chest: Effort normal. No stridor. No respiratory distress. He has wheezes. He has no rales. He exhibits no tenderness.   Abdominal: Soft. There is no tenderness.   Lymphadenopathy:     He has no cervical adenopathy.       Lab Results   Component Value Date    CHOL 169 07/08/2016    CHOL 83 (L) 11/18/2014    CHOL 212 (H) 11/18/2013    TRIG 539 (H) 07/08/2016    TRIG 94 11/18/2014    TRIG 214 (H) 11/18/2013    HDL 35 (L) 07/08/2016    HDL 29 (L) 11/18/2014    HDL 38 (L) 11/18/2013    TOTALCHOLEST 4.8 07/08/2016    TOTALCHOLEST 2.9 11/18/2014    TOTALCHOLEST 5.6 (H) 11/18/2013    NONHDLCHOL 134 07/08/2016    NONHDLCHOL 54 11/18/2014    NONHDLCHOL 174 11/18/2013       Lab Results   Component Value Date    HGBA1C 5.9 07/08/2016       Assessment:      ICD-10-CM ICD-9-CM   1. Asthma exacerbation in COPD J44.1 493.22    J45.901          Plan:  We'll treat with prednisone taper as below.  Continue with DuoNeb every 4-6 hours.  We'll add Z-Efraín and x-ray pending.  Orders will be sent for portable oxygen.  Orders Placed This Encounter   Procedures    X-Ray Chest PA And Lateral       Current Outpatient Prescriptions   Medication Sig Dispense Refill    albuterol-ipratropium 2.5mg-0.5mg/3mL (DUONEB) 0.5 mg-3 mg(2.5 mg base)/3 mL nebulizer solution Take 3 mLs by nebulization every 6 (six) hours as needed for Wheezing. Rescue 3 mL 12    aspirin (ECOTRIN) 81 MG EC tablet Take 81 mg by mouth every evening.      atenolol  (TENORMIN) 25 MG tablet TAKE 2 TABLETS ONE TIME DAILY 180 tablet 3    atorvastatin (LIPITOR) 80 MG tablet TAKE 1 TABLET ONE TIME DAILY 90 tablet 4    CARTIA  mg Cp24 TAKE 1 CAPSULE EVERY DAY 90 capsule 3    clopidogrel (PLAVIX) 75 mg tablet TAKE 1 TABLET ONE TIME DAILY 90 tablet 3    co-enzyme Q-10 50 mg capsule Take 50 mg by mouth once daily.      furosemide (LASIX) 40 MG tablet TAKE 1 TABLET EVERY DAY 90 tablet 3    GLUCOSAMINE HCL/CHONDR HOLT A NA (OSTEO BI-FLEX ORAL) Take by mouth 2 (two) times daily.      hydrocodone-acetaminophen 5-325mg (NORCO) 5-325 mg per tablet Take 1 tablet by mouth every 8 (eight) hours as needed for Pain. 18 tablet 0    isosorbide mononitrate (IMDUR) 120 MG 24 hr tablet TAKE 1 TABLET EVERY DAY 90 tablet 3    magnesium oxide (MAG-OX) 400 mg tablet Take 400 mg by mouth 2 (two) times daily.       MULTIVITAMIN W-MINERALS/LUTEIN (CENTRUM SILVER ORAL) Take by mouth.      nebulizer and compressor Rebeca 1 Units by Misc.(Non-Drug; Combo Route) route 4 (four) times daily as needed. 1 each 12    pantoprazole (PROTONIX) 40 MG tablet TAKE 1 TABLET ONE TIME DAILY 90 tablet 4    potassium chloride SA (K-DUR,KLOR-CON) 20 MEQ tablet TAKE 1 TABLET ONE TIME DAILY 90 tablet 3    RANEXA 500 mg Tb12 TAKE 1 TABLET TWICE DAILY 180 tablet 3    tamsulosin (FLOMAX) 0.4 mg Cp24 Take 1 capsule (0.4 mg total) by mouth once daily. 90 capsule 3    azithromycin (ZITHROMAX Z-DEEPAK) 250 MG tablet Take 1 tablet (250 mg total) by mouth once daily. Take 2 tabs on day 1 thereafter one tab daily by mouth for 4 days. 6 tablet 0    predniSONE (DELTASONE) 20 MG tablet Take 1 tablet (20 mg total) by mouth once daily. Take 3 tab daily x 3 dys, then 2 tabs daily x 3 dys, then 1 tab x 3 dys. Then stop 20 tablet 0     No current facility-administered medications for this visit.        There are no discontinued medications.    No Follow-up on file.      Dr Blaire Kent MD    Disclaimer: This note is prepared using  voice recognition system and as such is likely to have errors and is not proof read.

## 2017-05-16 ENCOUNTER — RESEARCH ENCOUNTER (OUTPATIENT)
Dept: RESEARCH | Facility: HOSPITAL | Age: 82
End: 2017-05-16

## 2017-05-16 ENCOUNTER — HOSPITAL ENCOUNTER (OUTPATIENT)
Dept: CARDIOLOGY | Facility: CLINIC | Age: 82
Discharge: HOME | End: 2017-05-16
Payer: MEDICARE

## 2017-05-16 ENCOUNTER — HOSPITAL ENCOUNTER (OUTPATIENT)
Dept: RADIOLOGY | Facility: HOSPITAL | Age: 82
Discharge: HOME | End: 2017-05-16
Attending: INTERNAL MEDICINE
Payer: MEDICARE

## 2017-05-16 ENCOUNTER — OFFICE VISIT (OUTPATIENT)
Dept: CARDIOLOGY | Facility: CLINIC | Age: 82
End: 2017-05-16
Payer: MEDICARE

## 2017-05-16 VITALS
WEIGHT: 199 LBS | BODY MASS INDEX: 31.98 KG/M2 | SYSTOLIC BLOOD PRESSURE: 118 MMHG | HEART RATE: 95 BPM | DIASTOLIC BLOOD PRESSURE: 66 MMHG | HEIGHT: 66 IN

## 2017-05-16 DIAGNOSIS — J44.89 ASTHMA WITH COPD: Primary | Chronic | ICD-10-CM

## 2017-05-16 DIAGNOSIS — I48.0 AF (PAROXYSMAL ATRIAL FIBRILLATION): Chronic | ICD-10-CM

## 2017-05-16 DIAGNOSIS — I35.0 AORTIC VALVE STENOSIS, UNSPECIFIED ETIOLOGY: ICD-10-CM

## 2017-05-16 DIAGNOSIS — I27.20 PULMONARY HTN: ICD-10-CM

## 2017-05-16 DIAGNOSIS — I10 ESSENTIAL HYPERTENSION: Chronic | ICD-10-CM

## 2017-05-16 DIAGNOSIS — I50.32 CHRONIC DIASTOLIC HEART FAILURE: Chronic | ICD-10-CM

## 2017-05-16 DIAGNOSIS — I25.10 CORONARY ARTERY DISEASE, OCCLUSIVE: Chronic | ICD-10-CM

## 2017-05-16 DIAGNOSIS — I35.0 NONRHEUMATIC AORTIC VALVE STENOSIS: Chronic | ICD-10-CM

## 2017-05-16 DIAGNOSIS — N18.30 CKD (CHRONIC KIDNEY DISEASE) STAGE 3, GFR 30-59 ML/MIN: ICD-10-CM

## 2017-05-16 DIAGNOSIS — I71.40 ABDOMINAL AORTIC ANEURYSM WITHOUT RUPTURE: Chronic | ICD-10-CM

## 2017-05-16 DIAGNOSIS — Z00.6 EXAMINATION OF PARTICIPANT IN CLINICAL TRIAL: ICD-10-CM

## 2017-05-16 DIAGNOSIS — Z99.81 ON HOME OXYGEN THERAPY: Chronic | ICD-10-CM

## 2017-05-16 DIAGNOSIS — R73.03 PREDIABETES: ICD-10-CM

## 2017-05-16 LAB
ESTIMATED PA SYSTOLIC PRESSURE: 60.76
GLOBAL PERICARDIAL EFFUSION: ABNORMAL
MITRAL VALVE MOBILITY: NORMAL
MITRAL VALVE REGURGITATION: ABNORMAL
RETIRED EF AND QEF - SEE NOTES: 60 (ref 55–65)
TRICUSPID VALVE REGURGITATION: ABNORMAL

## 2017-05-16 PROCEDURE — 1157F ADVNC CARE PLAN IN RCRD: CPT | Mod: Q1,S$GLB,, | Performed by: PHYSICIAN ASSISTANT

## 2017-05-16 PROCEDURE — 99999 PR PBB SHADOW E&M-EST. PATIENT-LVL IV: CPT | Mod: PBBFAC,,, | Performed by: PHYSICIAN ASSISTANT

## 2017-05-16 PROCEDURE — 99214 OFFICE O/P EST MOD 30 MIN: CPT | Mod: Q1,S$GLB,, | Performed by: PHYSICIAN ASSISTANT

## 2017-05-16 PROCEDURE — 93306 TTE W/DOPPLER COMPLETE: CPT | Mod: Q1,S$GLB,, | Performed by: INTERNAL MEDICINE

## 2017-05-16 PROCEDURE — 99499 UNLISTED E&M SERVICE: CPT | Mod: S$GLB,,, | Performed by: PHYSICIAN ASSISTANT

## 2017-05-16 PROCEDURE — 71020 XR CHEST PA AND LATERAL: CPT | Mod: 26,Q1,, | Performed by: RADIOLOGY

## 2017-05-16 PROCEDURE — 1159F MED LIST DOCD IN RCRD: CPT | Mod: Q1,S$GLB,, | Performed by: PHYSICIAN ASSISTANT

## 2017-05-16 PROCEDURE — 71020 XR CHEST PA AND LATERAL: CPT | Mod: TC

## 2017-05-16 PROCEDURE — 1126F AMNT PAIN NOTED NONE PRSNT: CPT | Mod: Q1,S$GLB,, | Performed by: PHYSICIAN ASSISTANT

## 2017-05-16 NOTE — PROGRESS NOTES
Subjective:   Patient ID:  Refugio Ferraro is a 89 y.o. male who presents for 2yr F/u in [facility] S3i trial    Referring: Dr. Farr    HPI   Mr. Ferraro presents for 3 year f/u s/p 23mm Rick TAVR via TF access in the PARTNER II trial. He is without cardiovascular complaints today and he denies CP, YA, PND, orthopnea, or LE edema. He continues to exercise regularly, though he is limited by chronic back and knee pain.     He was hospitalized around [facility] with PNA. He then had a syncopal episode at his sister's [employer] boil and fractured several of his ribs.     NYHA Class II, CCS Class I    Review of Systems   Constitution: Negative for chills, diaphoresis, fever, weakness, malaise/fatigue, weight gain and weight loss.   HENT: Negative for congestion, headaches, nosebleeds and sore throat.    Eyes: Negative for blurred vision, double vision, pain, vision loss in left eye, vision loss in right eye and visual disturbance.   Cardiovascular: Negative for chest pain, claudication, cyanosis, dyspnea on exertion, irregular heartbeat, leg swelling, near-syncope, orthopnea, palpitations, paroxysmal nocturnal dyspnea and syncope.   Respiratory: Negative for cough, hemoptysis, shortness of breath, snoring, sputum production and wheezing.    Endocrine: Negative for cold intolerance, heat intolerance, polydipsia and polyuria.   Hematologic/Lymphatic: Negative for adenopathy and bleeding problem. Does not bruise/bleed easily.   Skin: Negative for color change, poor wound healing and rash.   Musculoskeletal: Negative for arthritis, back pain, falls, joint pain, muscle weakness, myalgias and neck pain.   Gastrointestinal: Negative for abdominal pain, anorexia, change in bowel habit, constipation, diarrhea, heartburn, hematemesis, hematochezia, hemorrhoids, jaundice, melena, nausea and vomiting.   Genitourinary: Negative for bladder incontinence, flank pain, hematuria, hesitancy, incomplete emptying and nocturia.  "  Neurological: Negative for excessive daytime sleepiness, dizziness, focal weakness, light-headedness, loss of balance, numbness, paresthesias, seizures, sensory change, tremors and vertigo.   Psychiatric/Behavioral: Negative for altered mental status, depression, hallucinations and memory loss. The patient does not have insomnia and is not nervous/anxious.        Vitals:    05/16/17 1104 05/16/17 1111   BP: (!) 112/59 118/66   Pulse: 95    Weight: 90.3 kg (199 lb)    Height: 5' 6" (1.676 m)      Body mass index is 32.12 kg/(m^2).     Objective:   Physical Exam   Constitutional: He is oriented to person, place, and time. He appears well-developed and well-nourished. No distress.   HENT:   Head: Normocephalic and atraumatic.   Eyes: Conjunctivae and EOM are normal. Pupils are equal, round, and reactive to light. No scleral icterus.   Neck: Normal range of motion. Neck supple. No JVD present. No tracheal deviation present. No thyromegaly present.   Cardiovascular: Normal rate, regular rhythm, S1 normal, S2 normal, intact distal pulses and normal pulses.  PMI is not displaced.  Exam reveals no gallop, no S3, no S4 and no friction rub.    No murmur heard.  Pulmonary/Chest: Effort normal and breath sounds normal. No respiratory distress. He has no wheezes. He has no rales. He exhibits no tenderness.   Abdominal: Soft. Bowel sounds are normal. He exhibits no distension and no mass. There is no hepatosplenomegaly. There is no tenderness.   Musculoskeletal: Normal range of motion. He exhibits no edema or tenderness.   Neurological: He is alert and oriented to person, place, and time.   Skin: Skin is warm and dry. No rash noted. No erythema.   Psychiatric: He has a normal mood and affect. His behavior is normal.       Assessment:     1. Aortic valve disorders - s/p 23mm Rick S3 TAVR. Doing well.    2. Atrial fibrillation, permanent - not on AC for unclear reason, on DAPT   3. CAD (coronary artery disease) - s/p LM HORACIO and " RCA HORACIO on 4/11/14.   4. Chronic kidney disease (CKD), stage III (moderate) - stable   5. HTN (hypertension) - controlled   6. Pleural effusion - s/p thoracentesis x 3, most recently on 3/20/14   7. Chronic Diastolic HF - well-compensated clinically at this time.       Plan:     F/U per PARTNER protocol.  SBEP for life.  ASA indefinitely.

## 2017-05-16 NOTE — PROGRESS NOTES
Mr Ferraro is here for his 3 year follow up in the Partner S3U research trial.  His NIHSS=0 and mRs=0.  He had his study-related Echo, labs and chest x-ray.  He was seen and examined by AEBL Rodriguez.  He stated he was in the hospital with pneumonia, and he also had a syncopal episode, fell and broke some ribs.  He has no cardiac complaints.  His next follow up will be in one year and he agrees to continue in the trial.

## 2017-05-31 RX ORDER — PANTOPRAZOLE SODIUM 40 MG/1
TABLET, DELAYED RELEASE ORAL
Qty: 90 TABLET | Refills: 4 | OUTPATIENT
Start: 2017-05-31

## 2017-06-12 RX ORDER — PANTOPRAZOLE SODIUM 40 MG/1
TABLET, DELAYED RELEASE ORAL
Qty: 90 TABLET | Refills: 3 | Status: SHIPPED | OUTPATIENT
Start: 2017-06-12

## 2017-06-12 NOTE — TELEPHONE ENCOUNTER
----- Message from Madonna Silverio sent at 6/12/2017  8:59 AM CDT -----  Contact: Pt  Pt have questions regarding a refill on a med that was given at the hospital.  ..[phone] (home) [phone] (work)

## 2017-06-14 ENCOUNTER — TELEPHONE (OUTPATIENT)
Dept: FAMILY MEDICINE | Facility: CLINIC | Age: 82
End: 2017-06-14

## 2017-06-14 NOTE — TELEPHONE ENCOUNTER
----- Message from Arabella Hernandez sent at 6/14/2017 12:46 PM CDT -----  Contact: Ashley (pt's granddaughter)   Ashley called and stated she needed to the nurse. She stated that the pt needs a prescription for a portable oxygen concentrator sent to [facility] as soon as possible. She can be reached at [phone].    Thanks,  TF

## 2017-06-26 RX ORDER — CLOPIDOGREL BISULFATE 75 MG/1
TABLET ORAL
Qty: 90 TABLET | Refills: 3 | Status: SHIPPED | OUTPATIENT
Start: 2017-06-26

## 2017-07-18 RX ORDER — TAMSULOSIN HYDROCHLORIDE 0.4 MG/1
CAPSULE ORAL
Qty: 90 CAPSULE | Refills: 3 | Status: SHIPPED | OUTPATIENT
Start: 2017-07-18

## 2017-07-18 NOTE — TELEPHONE ENCOUNTER
Called and informed patient that we didn't call him from Dr. Alonso's office.advised to listen to the VM and get the doctors names. He expressed understanding.

## 2017-07-18 NOTE — TELEPHONE ENCOUNTER
----- Message from aMg Shepherd sent at 7/18/2017  3:58 PM CDT -----  Contact: [phone]  Patient returning call. Please call back at [phone].      Thanks,  Mag Shepherd

## 2017-08-10 RX ORDER — ATENOLOL 25 MG/1
TABLET ORAL
Qty: 180 TABLET | Refills: 3 | Status: SHIPPED | OUTPATIENT
Start: 2017-08-10

## 2017-08-15 DIAGNOSIS — E78.5 HYPERLIPIDEMIA: ICD-10-CM

## 2017-08-15 RX ORDER — ATORVASTATIN CALCIUM 80 MG/1
TABLET, FILM COATED ORAL
Qty: 90 TABLET | Refills: 4 | Status: SHIPPED | OUTPATIENT
Start: 2017-08-15

## 2017-08-22 ENCOUNTER — OFFICE VISIT (OUTPATIENT)
Dept: CARDIOLOGY | Facility: CLINIC | Age: 82
End: 2017-08-22
Payer: MEDICARE

## 2017-08-22 VITALS
WEIGHT: 196 LBS | BODY MASS INDEX: 30.76 KG/M2 | HEART RATE: 95 BPM | DIASTOLIC BLOOD PRESSURE: 60 MMHG | SYSTOLIC BLOOD PRESSURE: 112 MMHG | HEIGHT: 67 IN

## 2017-08-22 DIAGNOSIS — I25.10 CORONARY ARTERY DISEASE, OCCLUSIVE: Chronic | ICD-10-CM

## 2017-08-22 DIAGNOSIS — Z86.79 HISTORY OF AORTIC STENOSIS: Primary | Chronic | ICD-10-CM

## 2017-08-22 DIAGNOSIS — I50.32 CHRONIC DIASTOLIC HEART FAILURE: Chronic | ICD-10-CM

## 2017-08-22 PROCEDURE — 3008F BODY MASS INDEX DOCD: CPT | Mod: S$GLB,,, | Performed by: NUCLEAR MEDICINE

## 2017-08-22 PROCEDURE — 1159F MED LIST DOCD IN RCRD: CPT | Mod: S$GLB,,, | Performed by: NUCLEAR MEDICINE

## 2017-08-22 PROCEDURE — 99499 UNLISTED E&M SERVICE: CPT | Mod: S$GLB,,, | Performed by: NUCLEAR MEDICINE

## 2017-08-22 PROCEDURE — 1157F ADVNC CARE PLAN IN RCRD: CPT | Mod: S$GLB,,, | Performed by: NUCLEAR MEDICINE

## 2017-08-22 PROCEDURE — 1126F AMNT PAIN NOTED NONE PRSNT: CPT | Mod: S$GLB,,, | Performed by: NUCLEAR MEDICINE

## 2017-08-22 PROCEDURE — 99214 OFFICE O/P EST MOD 30 MIN: CPT | Mod: S$GLB,,, | Performed by: NUCLEAR MEDICINE

## 2017-08-22 PROCEDURE — 99999 PR PBB SHADOW E&M-EST. PATIENT-LVL III: CPT | Mod: PBBFAC,,, | Performed by: NUCLEAR MEDICINE

## 2017-08-22 NOTE — PROGRESS NOTES
Subjective:   Patient ID:  Refugio Ferraro is a 89 y.o. male who presents for follow-up of Congestive Heart Failure (6 month followup) and Valvular Heart Disease (AS POST TAVR)      HPI 1- VHD- DEG AS, POST TAVR   2- CAD, ANGINA FC 2  3- CHRONIC HFPEF , STAGE C, , FC 2-3  PATTERN OF YA UNCHANGED. NO ORTHOPNEA OR PND  NO CHEST DISCOMFORT. NO PALPITATIONS.  NO EDEMA. NO CALVE TENDERNESS  NO FOCAL CNS SYMPTOMS OR SIGNS TO SUGGEST TIA OR STROKE  NO ABNORMAL BLEEDING  CARD MED WELL TOLERATED    Review of Systems   Constitution: Negative for chills, fever, weakness, night sweats, weight gain and weight loss.   HENT: Negative for headaches and nosebleeds.    Eyes: Negative for blurred vision, double vision and visual disturbance.   Cardiovascular: Positive for dyspnea on exertion. Negative for chest pain, irregular heartbeat, leg swelling, orthopnea, palpitations, paroxysmal nocturnal dyspnea and syncope.   Respiratory: Negative for cough, hemoptysis and wheezing.    Endocrine: Negative for polydipsia and polyuria.   Hematologic/Lymphatic: Does not bruise/bleed easily.   Skin: Negative for rash.   Musculoskeletal: Negative for joint pain, joint swelling, muscle weakness and myalgias.   Gastrointestinal: Negative for abdominal pain, hematemesis, jaundice and melena.   Genitourinary: Negative for dysuria, hematuria and nocturia.   Neurological: Negative for dizziness, focal weakness and sensory change.   Psychiatric/Behavioral: Negative for depression. The patient does not have insomnia and is not nervous/anxious.      Family History   Problem Relation Age of Onset    Heart disease Father     Heart attack Brother     Heart disease Brother     Heart attack Brother     Stroke Brother     Heart disease Brother     Hypertension Brother     Heart attack Sister     Heart disease Sister     Emphysema Sister     Pulmonary embolism Mother     No Known Problems Brother     Anemia Neg Hx     Arrhythmia Neg Hx     Asthma  Neg Hx     Clotting disorder Neg Hx     Fainting Neg Hx     Heart failure Neg Hx     Hyperlipidemia Neg Hx     Atrial Septal Defect Neg Hx     Cancer Neg Hx     Diabetes Neg Hx     Kidney disease Neg Hx      Past Medical History:   Diagnosis Date    *Atrial fibrillation     AAA (abdominal aortic aneurysm)     Acute decompensated heart failure 4/8/2014    Acute on chronic respiratory failure 3/29/2017    Anticoagulant long-term use     aortic stenosis     degenerative- trileaflets    Aortic stenosis, severe 4/9/2014    Aortic valve disorders 5/13/2014    Arthritis     Asthma     Atrial fibrillation with RVR 1/30/2014    Atrial fibrillation, permanent 8/7/2013    Bilateral pneumonia 3/29/2017    Cancer 1970s, 2006, 2009    skin-removed from nose    Cardiomyopathy     Carotid atherosclerosis 6/27/2016    CHF (congestive heart failure)     CKD (chronic kidney disease) stage 3, GFR 30-59 ml/min     Coronary artery disease     Coronary artery disease involving native coronary artery of native heart without angina pectoris 10/29/2014    Per patient s/p stents x2     DDD (degenerative disc disease), cervical     Heart murmur     History of coronary angioplasty 10/3/2014    Hypertension     Hypotension, postural 8/7/2013    Pleural effusion     x3 total Thoracentesis 3/11/14 and 2/14/14     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 Rescue 3 mL 12    aspirin (ECOTRIN) 81 MG EC tablet Take 81 mg by mouth every evening.      atenolol (TENORMIN) 25 MG tablet TAKE 2 TABLETS ONE TIME DAILY 180 tablet 3     atorvastatin (LIPITOR) 80 MG tablet TAKE 1 TABLET ONE TIME DAILY 90 tablet 4    clopidogrel (PLAVIX) 75 mg tablet TAKE 1 TABLET ONE TIME DAILY 90 tablet 3    co-enzyme Q-10 50 mg capsule Take 50 mg by mouth once daily.      furosemide (LASIX) 40 MG tablet TAKE 1 TABLET EVERY DAY 90 tablet 3    isosorbide mononitrate (IMDUR) 120 MG 24 hr tablet TAKE 1 TABLET EVERY DAY 90 tablet 3    magnesium oxide (MAG-OX) 400 mg tablet Take 400 mg by mouth 2 (two) times daily.       MULTIVITAMIN W-MINERALS/LUTEIN (CENTRUM SILVER ORAL) Take by mouth.      nebulizer and compressor Rebeca 1 Units by Misc.(Non-Drug; Combo Route) route 4 (four) times daily as needed. 1 each 12    pantoprazole (PROTONIX) 40 MG tablet TAKE 1 TABLET ONE TIME DAILY 90 tablet 3    potassium chloride SA (K-DUR,KLOR-CON) 20 MEQ tablet TAKE 1 TABLET ONE TIME DAILY 90 tablet 3    RANEXA 500 mg Tb12 TAKE 1 TABLET TWICE DAILY 180 tablet 3    tamsulosin (FLOMAX) 0.4 mg Cp24 TAKE 1 CAPSULE ONE TIME DAILY 90 capsule 3    [DISCONTINUED] CARTIA  mg Cp24 TAKE 1 CAPSULE EVERY DAY 90 capsule 3    GLUCOSAMINE HCL/CHONDR HOLT A NA (OSTEO BI-FLEX ORAL) Take by mouth 2 (two) times daily.       No current facility-administered medications on file prior to visit.      Review of patient's allergies indicates:  No Known Allergies    Objective:     Physical Exam   Constitutional: He is oriented to person, place, and time. He appears well-developed. No distress.   HENT:   Head: Normocephalic.   Eyes: Conjunctivae are normal. Pupils are equal, round, and reactive to light.   Neck: Neck supple. No JVD present. No thyromegaly present.   Cardiovascular: Normal rate, regular rhythm, normal heart sounds and intact distal pulses.  Exam reveals no gallop and no friction rub.    No murmur heard.  Pulses:       Carotid pulses are 2+ on the right side, and 2+ on the left side.       Radial pulses are 2+ on the right side, and 2+ on the left side.        Femoral pulses are 2+ on  the right side, and 2+ on the left side.       Popliteal pulses are 2+ on the right side, and 2+ on the left side.        Dorsalis pedis pulses are 2+ on the right side, and 2+ on the left side.        Posterior tibial pulses are 2+ on the right side, and 2+ on the left side.   Pulmonary/Chest: Breath sounds normal. He has no wheezes. He has no rales. He exhibits no tenderness.   Abdominal: Soft. Bowel sounds are normal. He exhibits no mass. There is no hepatosplenomegaly. There is no tenderness.   Musculoskeletal: He exhibits no edema or tenderness.        Cervical back: Normal.        Thoracic back: Normal.        Lumbar back: Normal.   Lymphadenopathy:     He has no cervical adenopathy.     He has no axillary adenopathy.        Right: No supraclavicular adenopathy present.        Left: No supraclavicular adenopathy present.   Neurological: He is alert and oriented to person, place, and time. He has normal strength. No sensory deficit. Gait normal.   Skin: Skin is warm. No cyanosis. No pallor. Nails show no clubbing.   Psychiatric: He has a normal mood and affect. His speech is normal and behavior is normal. Cognition and memory are normal.       Assessment:     1. History of aortic stenosis, S/P TAVR    2. Chronic diastolic heart failure    3. Coronary artery disease, occlusive      STABLE VHD- WELL FUNCTIONING PROSTHETIC AV.  CHF COMPENSATED  NO EVIDENCE OF ACTIVE MYOCARDIAL ISCHEMIA  NO ABNORMAL BLEEDING  CARD MED WELL TOLERATED  Plan:     History of aortic stenosis, S/P TAVR    Chronic diastolic heart failure    Coronary artery disease, occlusive    1- CONTINUE PRESENT CARD MANAGEMENT    2- RETURN IN 6 MONTHS.

## 2017-08-28 ENCOUNTER — TELEPHONE (OUTPATIENT)
Dept: CARDIOLOGY | Facility: CLINIC | Age: 82
End: 2017-08-28

## 2017-08-28 ENCOUNTER — TELEPHONE (OUTPATIENT)
Dept: FAMILY MEDICINE | Facility: CLINIC | Age: 82
End: 2017-08-28

## 2017-08-28 NOTE — TELEPHONE ENCOUNTER
----- Message from Kirstin Norman sent at 2017  8:45 AM CDT -----  Contact: Yoseph   States calling to get patient death certificate signed. Please adv/call [phone].//thanks. cw

## 2017-08-28 NOTE — TELEPHONE ENCOUNTER
Returned call. Sla requesting Dr. Farr to sign death certificate.  Yoseph was informed will need to obtain signature of provider than called time of death.

## 2017-08-28 NOTE — LETTER
August 28, 2017    Family of Mr.Donald ZACHARIAH Ferraro  [address]  [place] [address]             [facility]  [address] [address]  [facility] [address]  Phone: [phone]  Fax: [phone] To the family of Mr. Ferraro:    Please accept our deepest condolences in regards to the recent death of your family member, Mr. Ferraro. He was a most remarkable person, and it was always a pleasure to see him. I hope that we were able to provide him with some relief during the time that he was under our care.     On behalf of myself and my staff, please also extend our condolences to all of your family. If there is anything else that we can do for you, please do not hesitate to contact us.    Sincerely,        Blaire Kent MD

## 2017-08-28 NOTE — TELEPHONE ENCOUNTER
----- Message from Eileen Maurice sent at 8/28/2017 10:56 AM CDT -----  Contact: Surjit Hameed home  Mr Hameed needs to know if  is signing death certificate, please call Mr Hameed back at  [phone]